# Patient Record
Sex: FEMALE | Race: WHITE | URBAN - METROPOLITAN AREA
[De-identification: names, ages, dates, MRNs, and addresses within clinical notes are randomized per-mention and may not be internally consistent; named-entity substitution may affect disease eponyms.]

---

## 2017-08-03 PROBLEM — Z00.00 ENCOUNTER FOR PREVENTIVE HEALTH EXAMINATION: Status: ACTIVE | Noted: 2017-08-03

## 2017-08-04 ENCOUNTER — OUTPATIENT (OUTPATIENT)
Dept: OUTPATIENT SERVICES | Facility: HOSPITAL | Age: 72
LOS: 1 days | End: 2017-08-04
Payer: COMMERCIAL

## 2017-08-04 ENCOUNTER — APPOINTMENT (OUTPATIENT)
Dept: ULTRASOUND IMAGING | Facility: HOSPITAL | Age: 72
End: 2017-08-04

## 2017-08-04 PROCEDURE — 93880 EXTRACRANIAL BILAT STUDY: CPT

## 2017-08-04 PROCEDURE — 93880 EXTRACRANIAL BILAT STUDY: CPT | Mod: 26

## 2017-08-07 ENCOUNTER — OUTPATIENT (OUTPATIENT)
Dept: OUTPATIENT SERVICES | Facility: HOSPITAL | Age: 72
LOS: 1 days | End: 2017-08-07
Payer: COMMERCIAL

## 2017-08-07 PROCEDURE — 93306 TTE W/DOPPLER COMPLETE: CPT | Mod: 26

## 2017-08-07 PROCEDURE — 93306 TTE W/DOPPLER COMPLETE: CPT

## 2022-08-01 ENCOUNTER — APPOINTMENT (OUTPATIENT)
Dept: OTOLARYNGOLOGY | Facility: CLINIC | Age: 77
End: 2022-08-01

## 2022-08-01 DIAGNOSIS — H90.3 SENSORINEURAL HEARING LOSS, BILATERAL: ICD-10-CM

## 2022-08-01 DIAGNOSIS — H93.293 OTHER ABNORMAL AUDITORY PERCEPTIONS, BILATERAL: ICD-10-CM

## 2022-08-01 DIAGNOSIS — H61.23 IMPACTED CERUMEN, BILATERAL: ICD-10-CM

## 2022-08-01 PROCEDURE — 69210 REMOVE IMPACTED EAR WAX UNI: CPT

## 2022-08-01 PROCEDURE — 99202 OFFICE O/P NEW SF 15 MIN: CPT | Mod: 25

## 2022-08-01 RX ORDER — VORTIOXETINE 10 MG/1
10 TABLET, FILM COATED ORAL
Qty: 30 | Refills: 0 | Status: ACTIVE | COMMUNITY
Start: 2022-07-19

## 2022-08-01 RX ORDER — OLANZAPINE 5 MG/1
5 TABLET, FILM COATED ORAL
Qty: 90 | Refills: 0 | Status: ACTIVE | COMMUNITY
Start: 2022-05-10

## 2022-08-01 RX ORDER — QUETIAPINE FUMARATE 50 MG/1
50 TABLET ORAL
Qty: 90 | Refills: 0 | Status: ACTIVE | COMMUNITY
Start: 2022-07-19

## 2022-08-01 RX ORDER — MEMANTINE HYDROCHLORIDE 10 MG/1
10 TABLET, FILM COATED ORAL
Qty: 60 | Refills: 0 | Status: ACTIVE | COMMUNITY
Start: 2022-07-19

## 2022-08-01 RX ORDER — DIVALPROEX SODIUM 250 MG/1
250 TABLET, DELAYED RELEASE ORAL
Qty: 90 | Refills: 0 | Status: ACTIVE | COMMUNITY
Start: 2022-07-19

## 2022-08-01 RX ORDER — OLANZAPINE AND SAMIDORPHAN L-MALATE 10; 10 MG/1; MG/1
10-10 TABLET, FILM COATED ORAL
Qty: 30 | Refills: 0 | Status: ACTIVE | COMMUNITY
Start: 2022-07-21

## 2022-08-01 NOTE — REVIEW OF SYSTEMS
[Negative] : Heme/Lymph [Patient Intake Form Reviewed] : Patient intake form was reviewed [FreeTextEntry1] : cognitive impairment

## 2022-08-01 NOTE — ASSESSMENT
[FreeTextEntry1] : large amount cerumen cleared au\par question of underlying hearing loss\par rec audio\par declines at this time\par fu one y

## 2022-08-12 ENCOUNTER — EMERGENCY (EMERGENCY)
Facility: HOSPITAL | Age: 77
LOS: 0 days | Discharge: HOME | End: 2022-08-13
Attending: EMERGENCY MEDICINE
Payer: MEDICARE

## 2022-08-12 VITALS
HEART RATE: 59 BPM | SYSTOLIC BLOOD PRESSURE: 114 MMHG | WEIGHT: 160.06 LBS | HEIGHT: 62 IN | OXYGEN SATURATION: 97 % | TEMPERATURE: 98 F | DIASTOLIC BLOOD PRESSURE: 60 MMHG | RESPIRATION RATE: 16 BRPM

## 2022-08-12 DIAGNOSIS — Z20.822 CONTACT WITH AND (SUSPECTED) EXPOSURE TO COVID-19: ICD-10-CM

## 2022-08-12 DIAGNOSIS — F03.90 UNSPECIFIED DEMENTIA WITHOUT BEHAVIORAL DISTURBANCE: ICD-10-CM

## 2022-08-12 DIAGNOSIS — R53.83 OTHER FATIGUE: ICD-10-CM

## 2022-08-12 DIAGNOSIS — R56.9 UNSPECIFIED CONVULSIONS: ICD-10-CM

## 2022-08-12 DIAGNOSIS — I49.8 OTHER SPECIFIED CARDIAC ARRHYTHMIAS: ICD-10-CM

## 2022-08-12 LAB
ALBUMIN SERPL ELPH-MCNC: 3.2 G/DL — LOW (ref 3.3–5)
ALP SERPL-CCNC: 75 U/L — SIGNIFICANT CHANGE UP (ref 40–120)
ALT FLD-CCNC: 20 U/L — SIGNIFICANT CHANGE UP (ref 12–78)
ANION GAP SERPL CALC-SCNC: 7 MMOL/L — SIGNIFICANT CHANGE UP (ref 5–17)
APTT BLD: 23 SEC — LOW (ref 27.5–35.5)
AST SERPL-CCNC: 14 U/L — LOW (ref 15–37)
BASOPHILS # BLD AUTO: 0.04 K/UL — SIGNIFICANT CHANGE UP (ref 0–0.2)
BASOPHILS NFR BLD AUTO: 0.4 % — SIGNIFICANT CHANGE UP (ref 0–2)
BILIRUB SERPL-MCNC: 0.1 MG/DL — LOW (ref 0.2–1.2)
BUN SERPL-MCNC: 19 MG/DL — SIGNIFICANT CHANGE UP (ref 7–23)
CALCIUM SERPL-MCNC: 8.4 MG/DL — LOW (ref 8.5–10.1)
CHLORIDE SERPL-SCNC: 106 MMOL/L — SIGNIFICANT CHANGE UP (ref 96–108)
CO2 SERPL-SCNC: 27 MMOL/L — SIGNIFICANT CHANGE UP (ref 22–31)
CREAT SERPL-MCNC: 0.74 MG/DL — SIGNIFICANT CHANGE UP (ref 0.5–1.3)
EGFR: 83 ML/MIN/1.73M2 — SIGNIFICANT CHANGE UP
EOSINOPHIL # BLD AUTO: 0.15 K/UL — SIGNIFICANT CHANGE UP (ref 0–0.5)
EOSINOPHIL NFR BLD AUTO: 1.6 % — SIGNIFICANT CHANGE UP (ref 0–6)
FLUAV AG NPH QL: SIGNIFICANT CHANGE UP
FLUBV AG NPH QL: SIGNIFICANT CHANGE UP
GLUCOSE SERPL-MCNC: 129 MG/DL — HIGH (ref 70–99)
HCT VFR BLD CALC: 34.5 % — SIGNIFICANT CHANGE UP (ref 34.5–45)
HGB BLD-MCNC: 11.4 G/DL — LOW (ref 11.5–15.5)
IMM GRANULOCYTES NFR BLD AUTO: 0.8 % — SIGNIFICANT CHANGE UP (ref 0–1.5)
INR BLD: 1.2 RATIO — HIGH (ref 0.88–1.16)
LYMPHOCYTES # BLD AUTO: 1.87 K/UL — SIGNIFICANT CHANGE UP (ref 1–3.3)
LYMPHOCYTES # BLD AUTO: 19.7 % — SIGNIFICANT CHANGE UP (ref 13–44)
MCHC RBC-ENTMCNC: 31.8 PG — SIGNIFICANT CHANGE UP (ref 27–34)
MCHC RBC-ENTMCNC: 33 GM/DL — SIGNIFICANT CHANGE UP (ref 32–36)
MCV RBC AUTO: 96.1 FL — SIGNIFICANT CHANGE UP (ref 80–100)
MONOCYTES # BLD AUTO: 0.81 K/UL — SIGNIFICANT CHANGE UP (ref 0–0.9)
MONOCYTES NFR BLD AUTO: 8.6 % — SIGNIFICANT CHANGE UP (ref 2–14)
NEUTROPHILS # BLD AUTO: 6.52 K/UL — SIGNIFICANT CHANGE UP (ref 1.8–7.4)
NEUTROPHILS NFR BLD AUTO: 68.9 % — SIGNIFICANT CHANGE UP (ref 43–77)
PLATELET # BLD AUTO: 180 K/UL — SIGNIFICANT CHANGE UP (ref 150–400)
POTASSIUM SERPL-MCNC: 3.4 MMOL/L — LOW (ref 3.5–5.3)
POTASSIUM SERPL-SCNC: 3.4 MMOL/L — LOW (ref 3.5–5.3)
PROT SERPL-MCNC: 6.9 GM/DL — SIGNIFICANT CHANGE UP (ref 6–8.3)
PROTHROM AB SERPL-ACNC: 14 SEC — HIGH (ref 10.5–13.4)
RBC # BLD: 3.59 M/UL — LOW (ref 3.8–5.2)
RBC # FLD: 13.3 % — SIGNIFICANT CHANGE UP (ref 10.3–14.5)
RSV RNA NPH QL NAA+NON-PROBE: SIGNIFICANT CHANGE UP
SARS-COV-2 RNA SPEC QL NAA+PROBE: SIGNIFICANT CHANGE UP
SODIUM SERPL-SCNC: 140 MMOL/L — SIGNIFICANT CHANGE UP (ref 135–145)
TROPONIN I, HIGH SENSITIVITY RESULT: 4.15 NG/L — SIGNIFICANT CHANGE UP
WBC # BLD: 9.47 K/UL — SIGNIFICANT CHANGE UP (ref 3.8–10.5)
WBC # FLD AUTO: 9.47 K/UL — SIGNIFICANT CHANGE UP (ref 3.8–10.5)

## 2022-08-12 PROCEDURE — 0042T: CPT | Mod: MA

## 2022-08-12 PROCEDURE — 70496 CT ANGIOGRAPHY HEAD: CPT | Mod: 26,MA

## 2022-08-12 PROCEDURE — 70498 CT ANGIOGRAPHY NECK: CPT | Mod: 26,MA

## 2022-08-12 PROCEDURE — 0241U: CPT

## 2022-08-12 PROCEDURE — 70498 CT ANGIOGRAPHY NECK: CPT | Mod: MA

## 2022-08-12 PROCEDURE — 81001 URINALYSIS AUTO W/SCOPE: CPT

## 2022-08-12 PROCEDURE — 93010 ELECTROCARDIOGRAM REPORT: CPT

## 2022-08-12 PROCEDURE — 82962 GLUCOSE BLOOD TEST: CPT

## 2022-08-12 PROCEDURE — 96375 TX/PRO/DX INJ NEW DRUG ADDON: CPT

## 2022-08-12 PROCEDURE — 0042T: CPT

## 2022-08-12 PROCEDURE — 96374 THER/PROPH/DIAG INJ IV PUSH: CPT | Mod: XU

## 2022-08-12 PROCEDURE — 99291 CRITICAL CARE FIRST HOUR: CPT

## 2022-08-12 PROCEDURE — 99285 EMERGENCY DEPT VISIT HI MDM: CPT

## 2022-08-12 PROCEDURE — 70450 CT HEAD/BRAIN W/O DYE: CPT | Mod: XU,MA

## 2022-08-12 PROCEDURE — 71045 X-RAY EXAM CHEST 1 VIEW: CPT | Mod: 26

## 2022-08-12 PROCEDURE — 99285 EMERGENCY DEPT VISIT HI MDM: CPT | Mod: 25

## 2022-08-12 PROCEDURE — 85025 COMPLETE CBC W/AUTO DIFF WBC: CPT

## 2022-08-12 PROCEDURE — 93005 ELECTROCARDIOGRAM TRACING: CPT

## 2022-08-12 PROCEDURE — 85610 PROTHROMBIN TIME: CPT

## 2022-08-12 PROCEDURE — 71045 X-RAY EXAM CHEST 1 VIEW: CPT

## 2022-08-12 PROCEDURE — 70496 CT ANGIOGRAPHY HEAD: CPT | Mod: MA

## 2022-08-12 PROCEDURE — 85730 THROMBOPLASTIN TIME PARTIAL: CPT

## 2022-08-12 PROCEDURE — 84484 ASSAY OF TROPONIN QUANT: CPT

## 2022-08-12 PROCEDURE — 95816 EEG AWAKE AND DROWSY: CPT | Mod: 26

## 2022-08-12 PROCEDURE — 36415 COLL VENOUS BLD VENIPUNCTURE: CPT

## 2022-08-12 PROCEDURE — 95816 EEG AWAKE AND DROWSY: CPT

## 2022-08-12 PROCEDURE — 80053 COMPREHEN METABOLIC PANEL: CPT

## 2022-08-12 RX ORDER — LEVETIRACETAM 250 MG/1
1000 TABLET, FILM COATED ORAL ONCE
Refills: 0 | Status: COMPLETED | OUTPATIENT
Start: 2022-08-12 | End: 2022-08-12

## 2022-08-12 RX ADMIN — LEVETIRACETAM 400 MILLIGRAM(S): 250 TABLET, FILM COATED ORAL at 13:26

## 2022-08-12 RX ADMIN — Medication 1 MILLIGRAM(S): at 13:26

## 2022-08-12 NOTE — ED PROVIDER NOTE - NS ED ROS FT
Constitutional: No fevers, chills, or sweats. +lethargy  Cardiac: No chest pain, exertional dyspnea, orthopnea  Respiratory: No shortness of breath, no cough  GI: No abdominal pain, no N/V/D  Neuro: No headaches, no neck pain/stiffness, no numbness +seizure +L leg plegia   All other systems reviewed and are negative unless otherwise stated in the HPI.

## 2022-08-12 NOTE — ED ADULT NURSE NOTE - OBJECTIVE STATEMENT
Pt BIBEMS s/p seizure at 11:40 this morning, Pt daughter Renae at bedside states that pt has dementia at baseline and that she lives at home with her and that the pt has an aide in place at home. Renae explains that pt was on the floor seizing this morning and an ambulance was called. No hx of seizures, no hx of stroke. Pt nonverbal while assessing her, daughter states that she is usually verbal at home and able to do basic things at home, she eats by herself and ambulates to the bathroom at home. Pt on stretcher in room now unable to follow commands. Pt is moving all 4 extremities, and is agitated upon assessment. Dr Guevara and Dr Hoffmann at bedside, ativan administered.

## 2022-08-12 NOTE — ED PROVIDER NOTE - PATIENT PORTAL LINK FT
You can access the FollowMyHealth Patient Portal offered by [facility] by registering at the following website: [email or web address]. By joining [facility]’s FollowMyHealth portal, you will also be able to view your health information using other applications (apps) compatible with our system. You can access the FollowMyHealth Patient Portal offered by [facility] by registering at the following website: [email or web address]. By joining [facility]’s FollowMyHealth portal, you will also be able to view your health information using other applications (apps) compatible with our system.

## 2022-08-12 NOTE — ED ADULT NURSE NOTE - NSIMPLEMENTINTERV_GEN_ALL_ED
Implemented All Fall with Harm Risk Interventions:  [place] to call system. Call bell, personal items and telephone within reach. Instruct patient to call for assistance. Room bathroom lighting operational. Non-slip footwear when patient is off stretcher. Physically safe environment: no spills, clutter or unnecessary equipment. Stretcher in lowest position, wheels locked, appropriate side rails in place. Provide visual cue, wrist band, yellow gown, etc. Monitor gait and stability. Monitor for mental status changes and reorient to person, place, and time. Review medications for side effects contributing to fall risk. Reinforce activity limits and safety measures with patient and family. Provide visual clues: red socks.

## 2022-08-12 NOTE — ED PROVIDER NOTE - OBJECTIVE STATEMENT
76 y/o Nigerien speaking female with a PMHx of dementia presents to the ED after seizure. Per EMS, pt was found not moving L arm or leg, normal finger stick.On arrival to ED,  pt regained sponataneous movements of L side.  On olanzapine-samidorphan 10mg , divalproex 250mg, donzepezil 10mg , memantine 10mg , quetiapine 50mg , vortioxetine 10mg , olanzapine 5mg , difloxacin HCl 0.3%. 76 y/o [place] speaking female with a PMHx of dementia presents to the ED after seizure. Per EMS, pt was found not moving L arm or leg, normal finger stick. On arrival to ED,  pt regained spontaneous movements of L side. States pt was recently started on olanzapine- samidorphan 10mg,  notes pt was in normal state with at home nurse, was walking with walker, froze and begun to have seizure. No known trauma, seizure lasted 1min. Post-ictal symptoms of lethargy, not moving L leg, rolling eyes. No h/o seizures.  On divalproex 250mg, Donepezil 10mg , memantine 10mg , quetiapine 50mg , vortioxetine 10mg , olanzapine 5mg , difloxacin HCl 0.3%.

## 2022-08-12 NOTE — ED PROVIDER NOTE - PROGRESS NOTE DETAILS
CC:  Signover received from Dr. Guevara to f/u EEG report from Dr. Hoffmann, expect D/C if negative.  Received phone call from Dr. Hoffmann: EEG essentially negative, she advises D/C home off her Lybatyl & Zyprexa & to increase her Depakote to 2 x a day, outpt Neuro f/u. CC:  Informed by ED RN that pt still too drowsy after previously medicated with IV Ativan.  Will hold formal D/C until pt more awake.  ED RN calling daughter to give update. CC:  Pt still clinically too lethargic for proper D/C.  Daughter agrees to return in morning to pick her up, after pt wakes up. Danay NAYAK: Received s/o from Dr. Torres- patient with new onset seizure; EEG done in ED; I spoke to patient's daughter multiple times and wants to take her home; successful ambulation trial performed in the ED; UA negative; depakote increased to BID per neurology note; strict return precautions given.

## 2022-08-12 NOTE — ED PROVIDER NOTE - CLINICAL SUMMARY MEDICAL DECISION MAKING FREE TEXT BOX
Will get labs, CT , neuro w/o. NSR with sinus arrythmia at  64 bpm. Normal axis, normal intervals, no acute ischemic changes.

## 2022-08-12 NOTE — ED PROVIDER NOTE - CARE PROVIDER_API CALL
Kandis Hoffmann (MD)  Clinical Neurophysiology; EEGEpilepsy; Neurology; Sleep Medicine  [address] [facility], [place] [address]  Phone: [phone]  Fax: [phone]  Follow Up Time:

## 2022-08-12 NOTE — STROKE CODE NOTE - NSMDCONSULT QTN_Y FT
Patient is a 77y old  Female who presents with a chief complaint of seizure/unresponsive episode    Time patient arrived to ED: 12:39    HPI: 76 y/o Japanese speaking female with a PMHx of dementia presented to  ED on 8/19 s/p seizure, LKN is unknown. Per EMS, pt was found not moving L arm or leg, and upon arrival to ED, pt regained movement of L side. Daughter at bedside provided history. She states that this morning around 8:30, pt's aide saw pt making her bed, when she slipped and fell onto her behind. She was able to get up on her own. At 11:45 am daughter heard a loud bang, aide said she had taken a fall, and daughter found her laying in doorway of bathroom and she was seizing on her back, her eyes rolled, and the entire episode lasted for about a minute. She also had urinary incontinence at that time. Daughter called EMS and pt was taken to ED.    In ED code stroke was called. CTH showed no acute infarct or hemorrhage. CTA head and neck showed no LVO, stenosis or aneurysms. CT perfusion showed no core infarct. IV TPA was not given because sx were not consistent with stroke on presenation, pt was post-ictal, LKN was unclear, NIHSS ~22- patient is awake but is non responsive.  Pt was given Keppra load and 1 mg Ativan, as well as ASA.    Upon evaluation, pt cannot follow commands and will not speak. Daughter reports she has severe Alzheimer's, at baseline is AAO x 1 and can usually only state her name. Her psychiatrist recently started her on a new medication called Lybalvi, which she has taken for the past three days, and she had stopped her Seroquel for the past three days. Daughter reports pt has never had a seizure or stroke.     PAST MEDICAL & SURGICAL HISTORY:  Alzheimer's Disease  Depression    FAMILY HISTORY: Noncontributory     Social Hx:  Nonsmoker, no drug or alcohol use    Allergies: Strawberries and novocaine    MEDICATIONS  (Home):  Seroquel 50 mg qhs (recently discontinued)  Zyprexa 2.5 mg TID  Trintellix 10 mg qhs  Namenda 10 mg BID  Depakote 25 mg qhs  Lybalvi qhs    ROS: Unable to obtain    Vital Signs Last 24 Hrs  T(C): 36.4 (12 Aug 2022 12:41), Max: 36.4 (12 Aug 2022 12:41)  T(F): 97.6 (12 Aug 2022 12:41), Max: 97.6 (12 Aug 2022 12:41)  HR: 59 (12 Aug 2022 12:41) (59 - 59)  BP: 114/60 (12 Aug 2022 12:41) (114/60 - 114/60)  BP(mean): --  RR: 16 (12 Aug 2022 12:41) (16 - 16)  SpO2: 97% (12 Aug 2022 12:41) (97% - 97%)    Parameters below as of 12 Aug 2022 12:41  Patient On (Oxygen Delivery Method): room air    Physical Exam:  Gen: NAD, normocephalic  HEENT: PERRLA  Neck: Supple  Respiratory: Breath sounds are clear bilaterally  Cardiovascular: S1 and S2  Extremities:  no edema  Vascular: No carotid Bruit  Musculoskeletal: no joint swelling/tenderness, no abnormal movements  Skin: No rashes    Neurological Exam:  HF: Awake, not alert, cannot assess orientation as pt will not speak   CN: PERRL, +blinks to threat, no NLFD, tongue midline  Motor: Moves all 4 extremities spontaneously and anti-gravity, but will not hold up extremities upon command  Sens: Withdraws to tactile stimuli  Reflexes: Symmetric and normal, downgoing toes b/l  Coord:  Cannot assess  Gait/Balance: Cannot test    NIHSS ~ 22- although exam is grossly non focal    Labs:                        11.4   9.47  )-----------( 180      ( 12 Aug 2022 13:24 )             34.5     Radiology:  CT Angio Brain Stroke Protocol  w/ IV Cont (08.12.22 @ 13:00) >    IMPRESSION:  *  NO EVIDENCE OF AN ACUTE INTRACRANIAL HEMORRHAGE, MIDLINE SHIFT, OR   HYDROCEPHALUS. MODERATE ATROPHY WITH MILD WHITE MATTER ISCHEMIC CHANGES  *  NO HEMODYNAMIC SIGNIFICANT NARROWING WITHIN THE NECK.  *  NO PROXIMAL LARGE VESSEL OCCLUSION INTRACRANIALLY.  *  NO AREAS OF ISCHEMIA IDENTIFIED ON PERFUSION IMAGING.    A/P: 76 y/o Japanese speaking female with a PMHx of dementia presented to  ED on 8/19 s/p seizure, LKN is unknown. Per EMS, pt was found not moving L arm or leg, and upon arrival to ED, pt regained movement of L side. Daughter at bedside provided history. She states that this morning around 8:30, pt's aide saw pt making her bed, when she slipped and fell onto her behind. She was able to get up on her own. At 11:45 am daughter heard a loud bang, aide said she had taken a fall, and daughter found her laying in doorway of bathroom and she was seizing on her back, her eyes rolled, and the entire episode lasted for about a minute. She also had urinary incontinence at that time. Daughter called EMS and pt was taken to ED.    In ED code stroke was called. CTH showed no acute infarct or hemorrhage. CTA head and neck showed no LVO, stenosis or aneurysms. CT perfusion showed no core infarct. IV TPA was not given because sx were not consistent with stroke on presentation, pt was post-ictal, LKN was unclear, NIHSS ~22- patient is awake but is non responsive.  Pt was given Keppra load and 1 mg Ativan, as well as ASA.    #S/p seizure episode, with dementia at baseline    #R/o acute CVA- less likely given presentation    - MRI head ordered to r/o intracranial pathology as cause for seizure  - Pt currently takes depakote at night only; can increase dose for seizure prophylaxis   - ASA 81 mg QD  - Atorvastatin, lipid profile, Hgb A1c within 24 hours  - Neuro checks Q4h  - Vital signs Q4h  - Blood glucose checks Q6h for 1st 24hrs  - Echo  - PT eval  - Dysphagia screen today  - DVT prophylaxis  - Telemonitoring    Patient was seen and discussed with Dr. Hoffmann Patient is a 77y old  Female who presents with a chief complaint of seizure/unresponsive episode    Time patient arrived to ED: 12:39    HPI: 78 y/o Citizen of the [place] speaking female with a PMHx of dementia presented to  ED on 8/19 s/p seizure, LKN is unknown. Per EMS, pt was found not moving L arm or leg, and upon arrival to ED, pt regained movement of L side. Daughter at bedside provided history. She states that this morning around 8:30, pt's aide saw pt making her bed, when she slipped and fell onto her behind. She was able to get up on her own. At 11:45 am daughter heard a loud bang, aide said she had taken a fall, and daughter found her laying in doorway of bathroom and she was seizing on her back, her eyes rolled, and the entire episode lasted for about a minute. She also had urinary incontinence at that time. Daughter called EMS and pt was taken to ED.    In ED code stroke was called. CTH showed no acute infarct or hemorrhage. CTA head and neck showed no LVO, stenosis or aneurysms. CT perfusion showed no core infarct. IV TPA was not given because sx were not consistent with stroke on presenation, pt was post-ictal, LKN was unclear, NIHSS ~22- patient is awake but is non responsive.  Pt was given Keppra load and 1 mg Ativan.    Upon evaluation, pt cannot follow commands and will not speak. Daughter reports she has severe Alzheimer's, at baseline is AAO x 1 and can usually only state her name. Her psychiatrist recently started her on a new medication called Lybalvi, which she has taken for the past three days, and she had stopped her Seroquel for the past three days. Daughter reports pt has never had a seizure or stroke.     PAST MEDICAL & SURGICAL HISTORY:  Alzheimer's Disease  Depression    FAMILY HISTORY: Noncontributory     Social Hx:  Nonsmoker, no drug or alcohol use    Allergies: Strawberries and novocaine    MEDICATIONS  (Home):  Seroquel 50 mg qhs (recently discontinued)  Zyprexa 2.5 mg TID  Trintellix 10 mg qhs  Namenda 10 mg BID  Depakote 25 mg qhs  Lybalvi qhs    ROS: Unable to obtain    Vital Signs Last 24 Hrs  T(C): 36.4 (12 Aug 2022 12:41), Max: 36.4 (12 Aug 2022 12:41)  T(F): 97.6 (12 Aug 2022 12:41), Max: 97.6 (12 Aug 2022 12:41)  HR: 59 (12 Aug 2022 12:41) (59 - 59)  BP: 114/60 (12 Aug 2022 12:41) (114/60 - 114/60)  BP(mean): --  RR: 16 (12 Aug 2022 12:41) (16 - 16)  SpO2: 97% (12 Aug 2022 12:41) (97% - 97%)    Parameters below as of 12 Aug 2022 12:41  Patient On (Oxygen Delivery Method): room air    Physical Exam:  Gen: NAD, normocephalic  HEENT: PERRLA  Neck: Supple  Respiratory: Breath sounds are clear bilaterally  Cardiovascular: S1 and S2  Extremities:  no edema  Vascular: No carotid Bruit  Musculoskeletal: no joint swelling/tenderness, no abnormal movements  Skin: No rashes    Neurological Exam:  HF: Awake, not alert, cannot assess orientation as pt will not speak   CN: PERRL, +blinks to threat, no NLFD, tongue midline  Motor: Moves all 4 extremities spontaneously and anti-gravity, but will not hold up extremities upon command  Sens: Withdraws to tactile stimuli  Reflexes: Symmetric and normal, downgoing toes b/l  Coord:  Cannot assess  Gait/Balance: Cannot test    NIHSS ~ 22- although exam is grossly non focal    Labs:                        11.4   9.47  )-----------( 180      ( 12 Aug 2022 13:24 )             34.5     Radiology:  CT Angio Brain Stroke Protocol  w/ IV Cont (08.12.22 @ 13:00) >    IMPRESSION:  *  NO EVIDENCE OF AN ACUTE INTRACRANIAL HEMORRHAGE, MIDLINE SHIFT, OR   HYDROCEPHALUS. MODERATE ATROPHY WITH MILD WHITE MATTER ISCHEMIC CHANGES  *  NO HEMODYNAMIC SIGNIFICANT NARROWING WITHIN THE NECK.  *  NO PROXIMAL LARGE VESSEL OCCLUSION INTRACRANIALLY.  *  NO AREAS OF ISCHEMIA IDENTIFIED ON PERFUSION IMAGING.    A/P: 78 y/o Citizen of the [place] speaking female with a PMHx of dementia presented to  ED on 8/19 s/p seizure, LKN is unknown. Per EMS, pt was found not moving L arm or leg, and upon arrival to ED, pt regained movement of L side. Daughter at bedside provided history. She states that this morning around 8:30, pt's aide saw pt making her bed, when she slipped and fell onto her behind. She was able to get up on her own. At 11:45 am daughter heard a loud bang, aide said she had taken a fall, and daughter found her laying in doorway of bathroom and she was seizing on her back, her eyes rolled, and the entire episode lasted for about a minute. She also had urinary incontinence at that time. Daughter called EMS and pt was taken to ED.    In ED code stroke was called. CTH showed no acute infarct or hemorrhage. CTA head and neck showed no LVO, stenosis or aneurysms. CT perfusion showed no core infarct. IV TPA was not given because sx were not consistent with stroke on presentation, pt was post-ictal, LKN was unclear, NIHSS ~22- patient is awake but is non responsive.  Pt was given Keppra load and 1 mg Ativan.    #S/p seizure episode, with dementia at baseline    If admitted:     - MRI head to r/o intracranial pathology as cause for seizure  - Routine EEG can be done now in ED   - Pt currently takes depakote at night only; can increase dose for seizure prophylaxis   - Dysphagia screen today  - DVT prophylaxis    Patient was seen and discussed with Dr. Hoffmann

## 2022-08-12 NOTE — ED PROVIDER NOTE - NSFOLLOWUPINSTRUCTIONS_ED_ALL_ED_FT
You are cleared for discharge home.  Stop the Lybatyl & your Zyprexa.  Increase your Depokate to 2 x a day,.  Follow up with own neurologist or Dr. Hoffmann as per below.  Follow up with own primary & psychiatry doctors.  Continue your other medications as per routine.      Seizure, Adult      A seizure is a sudden burst of abnormal electrical and chemical activity in the brain. Seizures usually last from 30 seconds to 2 minutes.       What are the causes?    Common causes of this condition include:  •Fever or infection.    •Problems that affect the brain. These may include:  •A brain or head injury.      •Bleeding in the brain.      •A brain tumor.        •Low levels of blood sugar or salt.      •Kidney problems or liver problems.      •Conditions that are passed from parent to child (are inherited).    •Problems with a substance, such as:  •Having a reaction to a drug or a medicine.      •Stopping the use of a substance all of a sudden (withdrawal).        •A stroke.      •Disorders that affect how you develop.      Sometimes, the cause may not be known.       What increases the risk?    •Having someone in your family who has epilepsy. In this condition, seizures happen again and again over time. They have no clear cause.    •Having had a tonic–clonic seizure before. This type of seizure causes you to:  •Tighten the muscles of the whole body.      •Lose consciousness.        •Having had a head injury or strokes before.      •Having had a lack of oxygen at birth.        What are the signs or symptoms?    There are many types of seizures. The symptoms vary depending on the type of seizure you have.    Symptoms during a seizure     •Shaking that you cannot control (convulsions) with fast, jerky movements of muscles.      •Stiffness of the body.      •Breathing problems.      •Feeling mixed up (confused).      •Staring or not responding to sound or touch.      •Head nodding.      •Eyes that blink, flutter, or move fast.      •Drooling, grunting, or making clicking sounds with your mouth      •Losing control of when you pee or poop.      Symptoms before a seizure     •Feeling afraid, nervous, or worried.      •Feeling like you may vomit.    •Feeling like:  •You are moving when you are not.      •Things around you are moving when they are not.        •Feeling like you saw or heard something before (déjà vu).      •Odd tastes or smells.      •Changes in how you see. You may see flashing lights or spots.      Symptoms after a seizure     •Feeling confused.      •Feeling sleepy.      •Headache.       •Sore muscles.        How is this treated?    If your seizure stops on its own, you will not need treatment. If your seizure lasts longer than 5 minutes, you will normally need treatment. Treatment may include:  •Medicines given through an IV tube.      •Avoiding things, such as medicines, that are known to cause your seizures.      •Medicines to prevent seizures.      •A device to prevent or control seizures.      •Surgery.      •A diet low in carbohydrates and high in fat (ketogenic diet).        Follow these instructions at home:    Medicines     •Take over-the-counter and prescription medicines only as told by your doctor.      •Avoid foods or drinks that may keep your medicine from working, such as alcohol.      Activity     •Follow instructions about driving, swimming, or doing things that would be dangerous if you had another seizure. Wait until your doctor says it is safe for you to do these things.      •If you live in the [place] ask your local department of [employer] when you can drive.      •Get a lot of rest.        Teaching others    •Teach friends and family what to do when you have a seizure. They should:  •Help you get down to the ground.      •Protect your head and body.      •Loosen any clothing around your neck.      •Turn you on your side.      •Know whether or not you need emergency care.      •Stay with you until you are better.      •Also, tell them what not to do if you have a seizure. Tell them:  •They should not hold you down.      •They should not put anything in your mouth.        General instructions     •Avoid anything that gives you seizures.    •Keep a seizure diary. Write down:  •What you remember about each seizure.      •What you think caused each seizure.        •Keep all follow-up visits.        Contact a doctor if:    •You have another seizure or seizures. Call the doctor each time you have a seizure.      •The pattern of your seizures changes.      •You keep having seizures with treatment.      •You have symptoms of being sick or having an infection.      •You are not able to take your medicine.        Get help right away if:  •You have any of these problems:  •A seizure that lasts longer than 5 minutes.      •Many seizures in a row and you do not feel better between seizures.      •A seizure that makes it harder to breathe.      •A seizure and you can no longer speak or use part of your body.        •You do not wake up right after a seizure.      •You get hurt during a seizure.      •You feel confused or have pain right after a seizure.      These symptoms may be an emergency. Get help right away. Call your local emergency services (911 in the [place]   • Do not wait to see if the symptoms will go away.       • Do not drive yourself to the hospital.         Summary    •A seizure is a sudden burst of abnormal electrical and chemical activity in the brain. Seizures normally last from 30 seconds to 2 minutes.      •Causes of seizures include illness, injury to the head, low levels of blood sugar or salt, and certain conditions.      •Most seizures will stop on their own in less than 5 minutes. Seizures that last longer than 5 minutes are a medical emergency and need treatment right away.      •Many medicines are used to treat seizures. Take over-the-counter and prescription medicines only as told by your doctor.      This information is not intended to replace advice given to you by your health care provider. Make sure you discuss any questions you have with your health care provider.

## 2022-08-12 NOTE — ED ADULT TRIAGE NOTE - CHIEF COMPLAINT QUOTE
Patient comes in s/p seizure, lasting about 1 minute. Patient began to have a left sided gaze, left sided facial droop, and left sided weakness after seizure. Last known well 1200. MD Guevara assessed patient at triage, code stroke called at 1241.

## 2022-08-12 NOTE — ED PROVIDER NOTE - PHYSICAL EXAMINATION
General: AA, NAD  HEENT: NCAT  Cardiac: Normal rate and rhythym, no murmurs, normal peripheral perfusion  Respiratory: Normal rate and effort. CTAB  GI: Soft, nondistended, nontender  Neuro: No focal deficits. DAWSON equally x4, sensation to light touch intact throughout, moving all extremities, no focal deficits, cranial nerves intact. NIH 0.  MSK: FROMx4, no focal bony tenderness, no peripheral edema   Skin: No rash

## 2022-08-13 VITALS
RESPIRATION RATE: 17 BRPM | HEART RATE: 83 BPM | OXYGEN SATURATION: 95 % | SYSTOLIC BLOOD PRESSURE: 148 MMHG | TEMPERATURE: 98 F | DIASTOLIC BLOOD PRESSURE: 83 MMHG

## 2022-08-13 LAB
APPEARANCE UR: CLEAR — SIGNIFICANT CHANGE UP
BILIRUB UR-MCNC: NEGATIVE — SIGNIFICANT CHANGE UP
COLOR SPEC: YELLOW — SIGNIFICANT CHANGE UP
DIFF PNL FLD: ABNORMAL
GLUCOSE UR QL: NEGATIVE — SIGNIFICANT CHANGE UP
KETONES UR-MCNC: NEGATIVE — SIGNIFICANT CHANGE UP
LEUKOCYTE ESTERASE UR-ACNC: NEGATIVE — SIGNIFICANT CHANGE UP
NITRITE UR-MCNC: NEGATIVE — SIGNIFICANT CHANGE UP
PH UR: 6 — SIGNIFICANT CHANGE UP (ref 5–8)
PROT UR-MCNC: NEGATIVE — SIGNIFICANT CHANGE UP
SP GR SPEC: 1.01 — SIGNIFICANT CHANGE UP (ref 1.01–1.02)
UROBILINOGEN FLD QL: NEGATIVE — SIGNIFICANT CHANGE UP

## 2022-08-13 RX ORDER — DIVALPROEX SODIUM 500 MG/1
1 TABLET, DELAYED RELEASE ORAL
Qty: 60 | Refills: 0
Start: 2022-08-13 | End: 2022-09-11

## 2022-08-13 RX ORDER — DIVALPROEX SODIUM 500 MG/1
250 TABLET, DELAYED RELEASE ORAL ONCE
Refills: 0 | Status: COMPLETED | OUTPATIENT
Start: 2022-08-13 | End: 2022-08-13

## 2022-08-13 RX ORDER — MEMANTINE HYDROCHLORIDE 10 MG/1
10 TABLET ORAL ONCE
Refills: 0 | Status: COMPLETED | OUTPATIENT
Start: 2022-08-13 | End: 2022-08-13

## 2022-08-13 RX ORDER — DIVALPROEX SODIUM 500 MG/1
250 TABLET, DELAYED RELEASE ORAL ONCE
Refills: 0 | Status: DISCONTINUED | OUTPATIENT
Start: 2022-08-13 | End: 2022-08-13

## 2022-08-13 RX ADMIN — DIVALPROEX SODIUM 250 MILLIGRAM(S): 500 TABLET, DELAYED RELEASE ORAL at 08:51

## 2022-08-13 RX ADMIN — MEMANTINE HYDROCHLORIDE 10 MILLIGRAM(S): 10 TABLET ORAL at 08:51

## 2022-08-13 NOTE — ED ADULT NURSE REASSESSMENT NOTE - NS ED NURSE REASSESS COMMENT FT1
pt. awake and severely confused, AOx0, attempts to get out of stretcher, unable to obtain full set of VS due to confusion. urine sent, aide arrived and now at bedside. seizure precautions maintained, pt. placed on bed alarm. incontinence care provided.

## 2023-03-16 NOTE — ED ADULT NURSE NOTE - NS ED NURSE IV DC DT
Cryotherapy Text: The wound bed was treated with cryotherapy after the biopsy was performed. 13-Aug-2022 11:32

## 2023-08-17 NOTE — ED PROVIDER NOTE - PROVIDER TOKENS
Area M Indication Text: Tumors in this location are included in Area M (cheek, forehead, scalp, neck, jawline and pretibial skin).  Mohs surgery is indicated for tumors in these anatomic locations. PROVIDER:[TOKEN:[[id number]]]

## 2024-08-11 NOTE — ED ADULT NURSE NOTE - NSINTERVENTIONOPT_GEN_ALL_ED
Lactation Consultant Note  Lactation Consultation       Maternal Information       Maternal Assessment       Infant Assessment       Feeding Assessment       LATCH TOOL       Breast Pump       Other OB Lactation Tools       Patient Follow-up       Other OB Lactation Documentation       Recommendations/Summary  Per bedside RN, patient would like a breast pump ordered for home use.     Will fax her insurance information to [employer]. Contact information to [employer] left at the bedside (mom sleeping).   [id number], and CDC pump cleaning guide also left with outpatient lactation resource list.   
Hourly Rounding

## 2025-01-09 ENCOUNTER — INPATIENT (INPATIENT)
Facility: HOSPITAL | Age: 80
LOS: 1 days | Discharge: HOME | DRG: 312 | End: 2025-01-11
Attending: STUDENT IN AN ORGANIZED HEALTH CARE EDUCATION/TRAINING PROGRAM | Admitting: STUDENT IN AN ORGANIZED HEALTH CARE EDUCATION/TRAINING PROGRAM
Payer: MEDICARE

## 2025-01-09 VITALS
OXYGEN SATURATION: 94 % | HEART RATE: 74 BPM | TEMPERATURE: 98 F | RESPIRATION RATE: 18 BRPM | DIASTOLIC BLOOD PRESSURE: 73 MMHG | SYSTOLIC BLOOD PRESSURE: 126 MMHG

## 2025-01-09 DIAGNOSIS — R55 SYNCOPE AND COLLAPSE: ICD-10-CM

## 2025-01-09 DIAGNOSIS — I48.92 UNSPECIFIED ATRIAL FLUTTER: ICD-10-CM

## 2025-01-09 DIAGNOSIS — Z29.9 ENCOUNTER FOR PROPHYLACTIC MEASURES, UNSPECIFIED: ICD-10-CM

## 2025-01-09 DIAGNOSIS — R56.9 UNSPECIFIED CONVULSIONS: ICD-10-CM

## 2025-01-09 PROBLEM — Z78.9 OTHER SPECIFIED HEALTH STATUS: Chronic | Status: ACTIVE | Noted: 2022-08-14

## 2025-01-09 LAB
ALBUMIN SERPL ELPH-MCNC: 3.8 G/DL — SIGNIFICANT CHANGE UP (ref 3.3–5)
ALP SERPL-CCNC: 104 U/L — SIGNIFICANT CHANGE UP (ref 40–120)
ALT FLD-CCNC: 14 U/L — SIGNIFICANT CHANGE UP (ref 12–78)
ANION GAP SERPL CALC-SCNC: 4 MMOL/L — LOW (ref 5–17)
AST SERPL-CCNC: 16 U/L — SIGNIFICANT CHANGE UP (ref 15–37)
BASOPHILS # BLD AUTO: 0.05 K/UL — SIGNIFICANT CHANGE UP (ref 0–0.2)
BASOPHILS NFR BLD AUTO: 0.6 % — SIGNIFICANT CHANGE UP (ref 0–2)
BILIRUB SERPL-MCNC: 0.2 MG/DL — SIGNIFICANT CHANGE UP (ref 0.2–1.2)
BUN SERPL-MCNC: 24 MG/DL — HIGH (ref 7–23)
CALCIUM SERPL-MCNC: 9 MG/DL — SIGNIFICANT CHANGE UP (ref 8.5–10.1)
CHLORIDE SERPL-SCNC: 107 MMOL/L — SIGNIFICANT CHANGE UP (ref 96–108)
CO2 SERPL-SCNC: 28 MMOL/L — SIGNIFICANT CHANGE UP (ref 22–31)
CREAT SERPL-MCNC: 0.96 MG/DL — SIGNIFICANT CHANGE UP (ref 0.5–1.3)
EGFR: 60 ML/MIN/1.73M2 — SIGNIFICANT CHANGE UP
EOSINOPHIL # BLD AUTO: 0.09 K/UL — SIGNIFICANT CHANGE UP (ref 0–0.5)
EOSINOPHIL NFR BLD AUTO: 1.1 % — SIGNIFICANT CHANGE UP (ref 0–6)
FLUAV AG NPH QL: SIGNIFICANT CHANGE UP
FLUBV AG NPH QL: SIGNIFICANT CHANGE UP
GLUCOSE SERPL-MCNC: 121 MG/DL — HIGH (ref 70–99)
HCT VFR BLD CALC: 40.6 % — SIGNIFICANT CHANGE UP (ref 34.5–45)
HGB BLD-MCNC: 12.9 G/DL — SIGNIFICANT CHANGE UP (ref 11.5–15.5)
IMM GRANULOCYTES NFR BLD AUTO: 0.3 % — SIGNIFICANT CHANGE UP (ref 0–0.9)
LYMPHOCYTES # BLD AUTO: 1.39 K/UL — SIGNIFICANT CHANGE UP (ref 1–3.3)
LYMPHOCYTES # BLD AUTO: 17.6 % — SIGNIFICANT CHANGE UP (ref 13–44)
MCHC RBC-ENTMCNC: 30.6 PG — SIGNIFICANT CHANGE UP (ref 27–34)
MCHC RBC-ENTMCNC: 31.8 G/DL — LOW (ref 32–36)
MCV RBC AUTO: 96.2 FL — SIGNIFICANT CHANGE UP (ref 80–100)
MONOCYTES # BLD AUTO: 0.81 K/UL — SIGNIFICANT CHANGE UP (ref 0–0.9)
MONOCYTES NFR BLD AUTO: 10.2 % — SIGNIFICANT CHANGE UP (ref 2–14)
NEUTROPHILS # BLD AUTO: 5.56 K/UL — SIGNIFICANT CHANGE UP (ref 1.8–7.4)
NEUTROPHILS NFR BLD AUTO: 70.2 % — SIGNIFICANT CHANGE UP (ref 43–77)
PLATELET # BLD AUTO: 211 K/UL — SIGNIFICANT CHANGE UP (ref 150–400)
POTASSIUM SERPL-MCNC: 3.7 MMOL/L — SIGNIFICANT CHANGE UP (ref 3.5–5.3)
POTASSIUM SERPL-SCNC: 3.7 MMOL/L — SIGNIFICANT CHANGE UP (ref 3.5–5.3)
PROT SERPL-MCNC: 8.1 GM/DL — SIGNIFICANT CHANGE UP (ref 6–8.3)
RBC # BLD: 4.22 M/UL — SIGNIFICANT CHANGE UP (ref 3.8–5.2)
RBC # FLD: 14.1 % — SIGNIFICANT CHANGE UP (ref 10.3–14.5)
RSV RNA NPH QL NAA+NON-PROBE: SIGNIFICANT CHANGE UP
SARS-COV-2 RNA SPEC QL NAA+PROBE: SIGNIFICANT CHANGE UP
SODIUM SERPL-SCNC: 139 MMOL/L — SIGNIFICANT CHANGE UP (ref 135–145)
TROPONIN I, HIGH SENSITIVITY RESULT: 11.48 NG/L — SIGNIFICANT CHANGE UP
WBC # BLD: 7.92 K/UL — SIGNIFICANT CHANGE UP (ref 3.8–10.5)
WBC # FLD AUTO: 7.92 K/UL — SIGNIFICANT CHANGE UP (ref 3.8–10.5)

## 2025-01-09 PROCEDURE — 99285 EMERGENCY DEPT VISIT HI MDM: CPT | Mod: GC

## 2025-01-09 PROCEDURE — 36415 COLL VENOUS BLD VENIPUNCTURE: CPT

## 2025-01-09 PROCEDURE — 95816 EEG AWAKE AND DROWSY: CPT

## 2025-01-09 PROCEDURE — 80053 COMPREHEN METABOLIC PANEL: CPT

## 2025-01-09 PROCEDURE — 85025 COMPLETE CBC W/AUTO DIFF WBC: CPT

## 2025-01-09 PROCEDURE — 83735 ASSAY OF MAGNESIUM: CPT

## 2025-01-09 PROCEDURE — 72125 CT NECK SPINE W/O DYE: CPT | Mod: 26

## 2025-01-09 PROCEDURE — 84146 ASSAY OF PROLACTIN: CPT

## 2025-01-09 PROCEDURE — 84100 ASSAY OF PHOSPHORUS: CPT

## 2025-01-09 PROCEDURE — 71045 X-RAY EXAM CHEST 1 VIEW: CPT | Mod: 26

## 2025-01-09 PROCEDURE — 82550 ASSAY OF CK (CPK): CPT

## 2025-01-09 PROCEDURE — 83605 ASSAY OF LACTIC ACID: CPT

## 2025-01-09 PROCEDURE — 70450 CT HEAD/BRAIN W/O DYE: CPT | Mod: 26

## 2025-01-09 PROCEDURE — 99222 1ST HOSP IP/OBS MODERATE 55: CPT

## 2025-01-09 PROCEDURE — 84443 ASSAY THYROID STIM HORMONE: CPT

## 2025-01-09 PROCEDURE — 93010 ELECTROCARDIOGRAM REPORT: CPT

## 2025-01-09 PROCEDURE — 81003 URINALYSIS AUTO W/O SCOPE: CPT

## 2025-01-09 RX ORDER — QUETIAPINE FUMARATE 100 MG/1
25 TABLET, FILM COATED ORAL ONCE
Refills: 0 | Status: COMPLETED | OUTPATIENT
Start: 2025-01-09 | End: 2025-01-09

## 2025-01-09 RX ORDER — SODIUM CHLORIDE 9 MG/ML
1000 INJECTION, SOLUTION INTRAMUSCULAR; INTRAVENOUS; SUBCUTANEOUS ONCE
Refills: 0 | Status: COMPLETED | OUTPATIENT
Start: 2025-01-09 | End: 2025-01-09

## 2025-01-09 RX ORDER — QUETIAPINE FUMARATE 100 MG/1
1 TABLET, FILM COATED ORAL
Refills: 0 | DISCHARGE

## 2025-01-09 RX ORDER — VORTIOXETINE 10 MG/1
10 TABLET, FILM COATED ORAL ONCE
Refills: 0 | Status: COMPLETED | OUTPATIENT
Start: 2025-01-09 | End: 2025-01-09

## 2025-01-09 RX ORDER — MAG HYDROX/ALUMINUM HYD/SIMETH 200-200-20
30 SUSPENSION, ORAL (FINAL DOSE FORM) ORAL EVERY 4 HOURS
Refills: 0 | Status: DISCONTINUED | OUTPATIENT
Start: 2025-01-09 | End: 2025-01-11

## 2025-01-09 RX ORDER — VORTIOXETINE 10 MG/1
1 TABLET, FILM COATED ORAL
Refills: 0 | DISCHARGE

## 2025-01-09 RX ORDER — LORAZEPAM 1 MG/1
0.5 TABLET ORAL ONCE
Refills: 0 | Status: DISCONTINUED | OUTPATIENT
Start: 2025-01-09 | End: 2025-01-09

## 2025-01-09 RX ORDER — APIXABAN 5 MG/1
5 TABLET, FILM COATED ORAL
Refills: 0 | Status: DISCONTINUED | OUTPATIENT
Start: 2025-01-09 | End: 2025-01-09

## 2025-01-09 RX ORDER — ONDANSETRON 4 MG/1
4 TABLET ORAL EVERY 8 HOURS
Refills: 0 | Status: DISCONTINUED | OUTPATIENT
Start: 2025-01-09 | End: 2025-01-11

## 2025-01-09 RX ORDER — MEMANTINE HYDROCHLORIDE 14 MG/1
1 CAPSULE, EXTENDED RELEASE ORAL
Refills: 0 | DISCHARGE

## 2025-01-09 RX ORDER — GINKGO BILOBA 40 MG
3 CAPSULE ORAL AT BEDTIME
Refills: 0 | Status: DISCONTINUED | OUTPATIENT
Start: 2025-01-09 | End: 2025-01-11

## 2025-01-09 RX ORDER — ACETAMINOPHEN 80 MG/.8ML
650 SOLUTION/ DROPS ORAL EVERY 6 HOURS
Refills: 0 | Status: DISCONTINUED | OUTPATIENT
Start: 2025-01-09 | End: 2025-01-11

## 2025-01-09 RX ORDER — DIVALPROEX SODIUM 500 MG/1
125 TABLET, DELAYED RELEASE ORAL ONCE
Refills: 0 | Status: COMPLETED | OUTPATIENT
Start: 2025-01-09 | End: 2025-01-09

## 2025-01-09 RX ADMIN — QUETIAPINE FUMARATE 25 MILLIGRAM(S): 100 TABLET, FILM COATED ORAL at 20:30

## 2025-01-09 RX ADMIN — VORTIOXETINE 10 MILLIGRAM(S): 10 TABLET, FILM COATED ORAL at 20:30

## 2025-01-09 RX ADMIN — APIXABAN 5 MILLIGRAM(S): 5 TABLET, FILM COATED ORAL at 20:30

## 2025-01-09 RX ADMIN — SODIUM CHLORIDE 1000 MILLILITER(S): 9 INJECTION, SOLUTION INTRAMUSCULAR; INTRAVENOUS; SUBCUTANEOUS at 16:02

## 2025-01-09 RX ADMIN — DIVALPROEX SODIUM 125 MILLIGRAM(S): 500 TABLET, DELAYED RELEASE ORAL at 20:30

## 2025-01-09 NOTE — H&P ADULT - HISTORY OF PRESENT ILLNESS
79-year-old female with history of cognitive decline with no previous formal diagnosis of seizures presents to the emergency department after being found on the floor of her bathroom by her aide who described general shaking movements.  It is unclear if she lost consciousness, she has a baseline mental status of AO x 0.  79F with h/o seizures and dementia was sent to  after having been found down on floor at home. I spoke with Renae over the phone, As per daughter pt has 24 hr aides. Patient had been in her usual state of health, she is able to ambulate to the bathroom on her own. Today the aide heard a "thump", and found her "seizing on the floor". By the time daughter arrived home EMS was also there and patient had stopped seizing. As per daughter, at baseline patient is able to answer "yes or no" to questions. In ED VSS, CBC/CMP unremarkable. ECG initially noted for Aflutter, patient later converted to NSR. Flu/COVID negative. Patient admitted for possible seizure.

## 2025-01-09 NOTE — ED ADULT TRIAGE NOTE - CHIEF COMPLAINT QUOTE
Pt BIBEMS from home found "shaking on the bathroom floor" by family per EMS. Pt has hx of seizures, dementia at baseline and Samoan speaking only. NA activated in EMS.

## 2025-01-09 NOTE — ED ADULT NURSE NOTE - NSFALLHARMRISKINTERV_ED_ALL_ED
Assistance OOB with selected safe patient handling equipment if applicable/Assistance with ambulation/Communicate risk of Fall with Harm to all staff, patient, and family/Monitor gait and stability/Monitor for mental status changes and reorient to person, place, and time, as needed/Move patient closer to nursing station/within visual sight of ED staff/Provide visual cue: red socks, yellow wristband, yellow gown, etc/Reinforce activity limits and safety measures with patient and family/Toileting schedule using arm’s reach rule for commode and bathroom/Use of alarms - bed, stretcher, chair and/or video monitoring/Bed in lowest position, wheels locked, appropriate side rails in place/Call bell, personal items and telephone in reach/Instruct patient to call for assistance before getting out of bed/chair/stretcher/Non-slip footwear applied when patient is off stretcher/[place] to call system/Physically safe environment - no spills, clutter or unnecessary equipment/Purposeful Proactive Rounding/Room/bathroom lighting operational, light cord in reach

## 2025-01-09 NOTE — ED PROVIDER NOTE - PHYSICAL EXAMINATION
General: not oriented to person, time, or place  Psych: mood appropriate  Head: normocephalic; atraumatic  Eyes: conjunctivae clear bilaterally, sclerae anicteric  ENT: no nasal flaring, patent nares  Cardio: non-tachycardic; skin warm and well perfused  Resp: normal respiratory effort; no accessory muscle use  GI: abdomen soft, nontender, nondistended  Neuro: normal sensation, moving all four extremities equally  Skin: No evidence of rash or bruising  MSK: cervical collar in place; no posterior neck tenderness to palpation  Lymph/Vasc: no LE edema

## 2025-01-09 NOTE — ED PROVIDER NOTE - PROGRESS NOTE DETAILS
Douglas Alicia MD: Patient found to be in new atrial flutter without rapid ventricular response, hemodynamically stable.  Patient to be admitted to the hospital. Progress note Case was discussed with cardiology, recommend starting Eliquis, n.p.o. after midnight with plan for possible BRAEDEN cardioversion tomorrow.

## 2025-01-09 NOTE — ED PROVIDER NOTE - ATTENDING CONTRIBUTION TO CARE
I, Shweta Jon DO,  performed the initial face to face bedside interview with this patient regarding history of present illness, review of symptoms and relevant past medical, social and family history.  I completed an independent physical examination.  I was the initial provider who evaluated this patient. I have signed out the follow up of any pending tests (i.e. labs, radiological studies) to the resident.  I have communicated the patient’s plan of care and disposition with the resident.

## 2025-01-09 NOTE — H&P ADULT - PROBLEM SELECTOR PLAN 1
As per daughter the patient becomes agitated, and unlikely to tolerate an EEG, if possible she would like to be informed 30 minutes prior to when the EEG will be done so she can come to keep patient calm.   last seizure 2022 as per daughter  On depakote for mood   Obtain lactate, CK, prolactin  Obtain UA to eval for infectious etiology  EEG  Neurology consult. As per daughter the patient becomes agitated, and unlikely to tolerate an EEG, if possible she would like to be informed 30 minutes prior to when the EEG will be done so she can come to keep patient calm.   last seizure 2022 as per daughter  On depakote for mood   Obtain lactate, CK, prolactin  Obtain UA to eval for infectious etiology  F/u TSH  EEG  Neurology consult.

## 2025-01-09 NOTE — H&P ADULT - PROBLEM SELECTOR PLAN 2
D/w daughter, she wants the patient to be more comfort focused, and would not like further evaluation of aflutter. Also would not like anticoagulation d/t risks of falls outweigh the benefit for stroke prophylaxis as the patient already has advanced dementia. Daughter would like to hold asa, DOAC, and no TTE.

## 2025-01-09 NOTE — ED PROVIDER NOTE - CLINICAL SUMMARY MEDICAL DECISION MAKING FREE TEXT BOX
In summary, this is an elderly female with moderate to severe dementia who was found on the floor due to either a mechanical fall, syncope, or a seizure of unclear etiology.  Exam does not demonstrate any significant deformities, patient is able to follow simple commands from her daughter in Faroese.  A stat CT of the head and neck was obtained, no acute fracture or intracranial hemorrhage.  Cervical collar was removed due to patient with persistent nontender neck and ability to use her extremities.  Given this is still possibly due to syncope, patient requires further inpatient management.  In the interim, we will perform a toxic/metabolic workup.

## 2025-01-09 NOTE — PATIENT PROFILE ADULT - NSPROPTRIGHTNOTIFYOBTAINDET_GEN_A_NUR
Pt was here for her 1 wk dressing removal after C/S- Bandage was removed with ease- incision was intact- dry blood noted on right side of incision and middle left side- incision was closed and not visible areas open- bruising noted on left side above the incision on the abdomen- pt was informed- incision was cleaned with peroxide and steri strips were applied- pt was instructed to keep area clean and dry, showers only no baths, swimming- lifting restrictions in place- if she becomes concerned at any time to notify the office- did inform steri strips may stay on for the next 1-2 weeks- pt rto in 1 week for 2 wk pp visit which patient stated she is already scheduled for. pt confused

## 2025-01-09 NOTE — H&P ADULT - PROBLEM SELECTOR PLAN 3
C/w her home meds.  Soft diet, crush medicine and give with chocolate pudding.   Fall and aspiration precautions  Delirium precautions.

## 2025-01-09 NOTE — H&P ADULT - NSHPPHYSICALEXAM_GEN_ALL_CORE
T(C): 36.7 (01-09-25 @ 19:00), Max: 36.9 (01-09-25 @ 14:17)  HR: 75 (01-09-25 @ 18:34) (74 - 75)  BP: 147/68 (01-09-25 @ 18:34) (126/73 - 147/68)  RR: 18 (01-09-25 @ 18:34) (18 - 18)  SpO2: 99% (01-09-25 @ 18:34) (94% - 99%)    General: non-toxic  HEENT: non-traumatic, perrla, eomi  Cardio: s1s2 regular rate and rhythm  Lungs: comfortable breathing, clear to auscultation  Abdomen: Soft, non-tender, non-distended  Neuro: AOx4  Ext: Pulses +2

## 2025-01-09 NOTE — PATIENT PROFILE ADULT - FALL HARM RISK - HARM RISK INTERVENTIONS

## 2025-01-09 NOTE — PATIENT PROFILE ADULT - NS PRO AD NO ADVANCE DIRECTIVE
No Area M Indication Text: Tumors in this location are included in Area M (cheek, forehead, scalp, neck, jawline and pretibial skin).  Mohs surgery is indicated for tumors 1 cm or larger in these anatomic locations.

## 2025-01-09 NOTE — ED ADULT NURSE NOTE - CHIEF COMPLAINT QUOTE
Pt BIBEMS from home found "shaking on the bathroom floor" by family per EMS. Pt has hx of seizures, dementia at baseline and Belarusian speaking only. NA activated in EMS. Detail Level: Detailed

## 2025-01-09 NOTE — H&P ADULT - CONVERSATION DETAILS
I discussed GOC with daughter Renae, as per daughter patient has a DNR/DNI w/ trial of NIPPV, she would like to maintain the same GOC, and will bring in the MOLST form tomorrow.

## 2025-01-09 NOTE — ED ADULT NURSE NOTE - OBJECTIVE STATEMENT
pt presents to ER s/p seizure like activity. daughter at bedside reports pt was found on her bathroom floor by the aid, unwitnessed and states "the aid said she was having shaky movements". daughter reports pt was treated for a seizure in the past. hx dementia, A&Ox0 at baseline, [place]ican speaking. pt denies any medical complaints.

## 2025-01-09 NOTE — ED PROVIDER NOTE - OBJECTIVE STATEMENT
79-year-old female with history of cognitive decline with no previous formal diagnosis of seizures presents to the emergency department after being found on the floor of her bathroom by her aide who described general shaking movements.  It is unclear if she lost consciousness, she has a baseline mental status of AO x 0.  She is not on anticoagulation.  The daughter reports that she has not had any recent cough, fevers, vomiting.  She was seen in our health system approximately 2-1/2 years ago for a previous seizure that was presumed to be due to a new medication she had started for cognitive enhancement.

## 2025-01-10 ENCOUNTER — TRANSCRIPTION ENCOUNTER (OUTPATIENT)
Age: 80
End: 2025-01-10

## 2025-01-10 LAB
ALBUMIN SERPL ELPH-MCNC: 3.2 G/DL — LOW (ref 3.3–5)
ALP SERPL-CCNC: 93 U/L — SIGNIFICANT CHANGE UP (ref 40–120)
ALT FLD-CCNC: 11 U/L — LOW (ref 12–78)
ANION GAP SERPL CALC-SCNC: 3 MMOL/L — LOW (ref 5–17)
APPEARANCE UR: CLEAR — SIGNIFICANT CHANGE UP
AST SERPL-CCNC: 22 U/L — SIGNIFICANT CHANGE UP (ref 15–37)
BASOPHILS # BLD AUTO: 0.04 K/UL — SIGNIFICANT CHANGE UP (ref 0–0.2)
BASOPHILS NFR BLD AUTO: 0.7 % — SIGNIFICANT CHANGE UP (ref 0–2)
BILIRUB SERPL-MCNC: 0.4 MG/DL — SIGNIFICANT CHANGE UP (ref 0.2–1.2)
BILIRUB UR-MCNC: NEGATIVE — SIGNIFICANT CHANGE UP
BUN SERPL-MCNC: 19 MG/DL — SIGNIFICANT CHANGE UP (ref 7–23)
CALCIUM SERPL-MCNC: 8.5 MG/DL — SIGNIFICANT CHANGE UP (ref 8.5–10.1)
CHLORIDE SERPL-SCNC: 110 MMOL/L — HIGH (ref 96–108)
CK SERPL-CCNC: 164 U/L — SIGNIFICANT CHANGE UP (ref 26–192)
CO2 SERPL-SCNC: 25 MMOL/L — SIGNIFICANT CHANGE UP (ref 22–31)
COLOR SPEC: YELLOW — SIGNIFICANT CHANGE UP
CREAT SERPL-MCNC: 0.83 MG/DL — SIGNIFICANT CHANGE UP (ref 0.5–1.3)
DIFF PNL FLD: NEGATIVE — SIGNIFICANT CHANGE UP
EGFR: 72 ML/MIN/1.73M2 — SIGNIFICANT CHANGE UP
EOSINOPHIL # BLD AUTO: 0.12 K/UL — SIGNIFICANT CHANGE UP (ref 0–0.5)
EOSINOPHIL NFR BLD AUTO: 2.2 % — SIGNIFICANT CHANGE UP (ref 0–6)
GLUCOSE SERPL-MCNC: 93 MG/DL — SIGNIFICANT CHANGE UP (ref 70–99)
GLUCOSE UR QL: NEGATIVE MG/DL — SIGNIFICANT CHANGE UP
HCT VFR BLD CALC: 37 % — SIGNIFICANT CHANGE UP (ref 34.5–45)
HGB BLD-MCNC: 11.7 G/DL — SIGNIFICANT CHANGE UP (ref 11.5–15.5)
IMM GRANULOCYTES NFR BLD AUTO: 0.2 % — SIGNIFICANT CHANGE UP (ref 0–0.9)
KETONES UR-MCNC: ABNORMAL MG/DL
LACTATE SERPL-SCNC: 1.5 MMOL/L — SIGNIFICANT CHANGE UP (ref 0.7–2)
LACTATE SERPL-SCNC: 2.1 MMOL/L — HIGH (ref 0.7–2)
LEUKOCYTE ESTERASE UR-ACNC: NEGATIVE — SIGNIFICANT CHANGE UP
LYMPHOCYTES # BLD AUTO: 1.83 K/UL — SIGNIFICANT CHANGE UP (ref 1–3.3)
LYMPHOCYTES # BLD AUTO: 33.6 % — SIGNIFICANT CHANGE UP (ref 13–44)
MAGNESIUM SERPL-MCNC: 2.3 MG/DL — SIGNIFICANT CHANGE UP (ref 1.6–2.6)
MCHC RBC-ENTMCNC: 30.6 PG — SIGNIFICANT CHANGE UP (ref 27–34)
MCHC RBC-ENTMCNC: 31.6 G/DL — LOW (ref 32–36)
MCV RBC AUTO: 96.9 FL — SIGNIFICANT CHANGE UP (ref 80–100)
MONOCYTES # BLD AUTO: 0.88 K/UL — SIGNIFICANT CHANGE UP (ref 0–0.9)
MONOCYTES NFR BLD AUTO: 16.1 % — HIGH (ref 2–14)
NEUTROPHILS # BLD AUTO: 2.57 K/UL — SIGNIFICANT CHANGE UP (ref 1.8–7.4)
NEUTROPHILS NFR BLD AUTO: 47.2 % — SIGNIFICANT CHANGE UP (ref 43–77)
NITRITE UR-MCNC: NEGATIVE — SIGNIFICANT CHANGE UP
PH UR: 5.5 — SIGNIFICANT CHANGE UP (ref 5–8)
PHOSPHATE SERPL-MCNC: 3.1 MG/DL — SIGNIFICANT CHANGE UP (ref 2.5–4.5)
PLATELET # BLD AUTO: SIGNIFICANT CHANGE UP K/UL (ref 150–400)
POTASSIUM SERPL-MCNC: 4.8 MMOL/L — SIGNIFICANT CHANGE UP (ref 3.5–5.3)
POTASSIUM SERPL-SCNC: 4.8 MMOL/L — SIGNIFICANT CHANGE UP (ref 3.5–5.3)
PROLACTIN SERPL-MCNC: 6.5 NG/ML — SIGNIFICANT CHANGE UP (ref 3.4–24.1)
PROT SERPL-MCNC: 7.3 GM/DL — SIGNIFICANT CHANGE UP (ref 6–8.3)
PROT UR-MCNC: NEGATIVE MG/DL — SIGNIFICANT CHANGE UP
RBC # BLD: 3.82 M/UL — SIGNIFICANT CHANGE UP (ref 3.8–5.2)
RBC # FLD: 14.1 % — SIGNIFICANT CHANGE UP (ref 10.3–14.5)
SODIUM SERPL-SCNC: 138 MMOL/L — SIGNIFICANT CHANGE UP (ref 135–145)
SP GR SPEC: 1.02 — SIGNIFICANT CHANGE UP (ref 1–1.03)
TSH SERPL-MCNC: 4.14 UU/ML — SIGNIFICANT CHANGE UP (ref 0.34–4.82)
UROBILINOGEN FLD QL: 0.2 MG/DL — SIGNIFICANT CHANGE UP (ref 0.2–1)
WBC # BLD: 5.45 K/UL — SIGNIFICANT CHANGE UP (ref 3.8–10.5)
WBC # FLD AUTO: 5.45 K/UL — SIGNIFICANT CHANGE UP (ref 3.8–10.5)

## 2025-01-10 PROCEDURE — 99233 SBSQ HOSP IP/OBS HIGH 50: CPT

## 2025-01-10 PROCEDURE — 99497 ADVNCD CARE PLAN 30 MIN: CPT | Mod: 25

## 2025-01-10 PROCEDURE — 99222 1ST HOSP IP/OBS MODERATE 55: CPT | Mod: FS

## 2025-01-10 PROCEDURE — 99223 1ST HOSP IP/OBS HIGH 75: CPT

## 2025-01-10 PROCEDURE — 99223 1ST HOSP IP/OBS HIGH 75: CPT | Mod: FS

## 2025-01-10 PROCEDURE — 95816 EEG AWAKE AND DROWSY: CPT | Mod: 26

## 2025-01-10 RX ORDER — DIVALPROEX SODIUM 500 MG/1
125 TABLET, DELAYED RELEASE ORAL ONCE
Refills: 0 | Status: COMPLETED | OUTPATIENT
Start: 2025-01-10 | End: 2025-01-10

## 2025-01-10 RX ORDER — DIVALPROEX SODIUM 500 MG/1
500 TABLET, DELAYED RELEASE ORAL AT BEDTIME
Refills: 0 | Status: DISCONTINUED | OUTPATIENT
Start: 2025-01-10 | End: 2025-01-11

## 2025-01-10 RX ADMIN — DIVALPROEX SODIUM 500 MILLIGRAM(S): 500 TABLET, DELAYED RELEASE ORAL at 21:00

## 2025-01-10 RX ADMIN — DIVALPROEX SODIUM 125 MILLIGRAM(S): 500 TABLET, DELAYED RELEASE ORAL at 14:57

## 2025-01-10 NOTE — DISCHARGE NOTE PROVIDER - NSDCMRMEDTOKEN_GEN_ALL_CORE_FT
divalproex sodium 125 mg oral delayed release tablet: 1 tab(s) orally 3 times a day *** 7 am, 3 pm, 9:15 pm***  memantine 10 mg oral tablet: 1 tab(s) orally 2 times a day ***7:30 am, 3 pm***  QUEtiapine 50 mg oral tablet: 1 tab(s) orally once a day *** 9:15 pm***  Trintellix 10 mg oral tablet: 1 tab(s) orally once a day ***9:15 pm***   divalproex sodium 500 mg oral tablet, extended release: 1 tab(s) orally once a day (at bedtime)  memantine 10 mg oral tablet: 1 tab(s) orally 2 times a day ***7:30 am, 3 pm***  QUEtiapine 50 mg oral tablet: 1 tab(s) orally once a day *** 9:15 pm***  Trintellix 10 mg oral tablet: 1 tab(s) orally once a day ***9:15 pm***

## 2025-01-10 NOTE — CONSULT NOTE ADULT - SUBJECTIVE AND OBJECTIVE BOX
CHIEF COMPLAINT: syncope     HPI:  Ms. Lemon is a 78 yo female with a hx seizures and dementia was sent to  after having been found down on floor at home. Per chart review, pt lives with daughter and has 24 hr aides. She was in her usual state of health when found down - with seizure like activity. In the ED, pt was found to be in atrial flutter for which cardiology was consulted. Spontaneously converted to NSR.     Per palliative discussion with daughter, she declines medical therapy for atrial flutter including anticoagulation. No invasive measures; would like to bring her home today.       PAST MEDICAL / SURGICAL HISTORY:  PAST MEDICAL & SURGICAL HISTORY:  No pertinent past medical history      No significant past surgical history          SOCIAL HISTORY:   Alcohol: Denied  Smoking: Nonsmoker  Drug Use: Denied  Marital Status:     FAMILY HISTORY: FAMILY HISTORY:      MEDICATIONS  (STANDING):  divalproex  milliGRAM(s) Oral once  divalproex  milliGRAM(s) Oral at bedtime    MEDICATIONS  (PRN):  acetaminophen     Tablet .. 650 milliGRAM(s) Oral every 6 hours PRN Temp greater or equal to 38C (100.4F), Mild Pain (1 - 3)  aluminum hydroxide/magnesium hydroxide/simethicone Suspension 30 milliLiter(s) Oral every 4 hours PRN Dyspepsia  melatonin 3 milliGRAM(s) Oral at bedtime PRN Insomnia  ondansetron Injectable 4 milliGRAM(s) IV Push every 8 hours PRN Nausea and/or Vomiting      Allergies    strawberry (Unknown)  No Known Drug Allergies    Intolerances        REVIEW OF SYSTEMS:  unable to obtain due to mental status     VITAL SIGNS:   Vital Signs Last 24 Hrs  T(C): 37.1 (10 Julian 2025 05:32), Max: 37.1 (10 Julian 2025 05:32)  T(F): 98.8 (10 Julian 2025 05:32), Max: 98.8 (10 Julian 2025 05:32)  HR: 52 (10 Julian 2025 05:32) (52 - 89)  BP: 137/84 (10 Julian 2025 05:32) (130/52 - 148/92)  BP(mean): 71 (09 Jan 2025 21:07) (71 - 89)  RR: 19 (10 Julian 2025 05:32) (16 - 19)  SpO2: 99% (10 Julian 2025 05:32) (99% - 99%)    Parameters below as of 10 Julian 2025 05:32  Patient On (Oxygen Delivery Method): room air        I&O's Summary    09 Jan 2025 07:01  -  10 Julian 2025 07:00  --------------------------------------------------------  IN: 0 mL / OUT: 360 mL / NET: -360 mL        PHYSICAL EXAM:  Constitutional: NAD, awake, confused   HEENT:  EOMI,  Pupils round, No oral cyanosis.  Pulmonary: Non-labored, breath sounds are clear bilaterally, No wheezing, rales or rhonchi  Cardiovascular: S1 and S2 bradycardic, systolic murmur   Gastrointestinal: soft, nontender.   Lymph: No peripheral edema. No cervical lymphadenopathy.  Neurological: disoriented        LABS:                        11.7   5.45  )-----------( See note    ( 10 Julian 2025 07:43 )             37.0                         12.9   7.92  )-----------( 211      ( 09 Jan 2025 15:52 )             40.6     10 Julian 2025 07:43    138    |  110    |  19     ----------------------------<  93     4.8     |  25     |  0.83   09 Jan 2025 15:52    139    |  107    |  24     ----------------------------<  121    3.7     |  28     |  0.96     Ca    8.5        10 Julian 2025 07:43  Ca    9.0        09 Jan 2025 15:52  Phos  3.1       10 Julian 2025 07:43  Mg     2.3       10 Julian 2025 07:43    TPro  7.3    /  Alb  3.2    /  TBili  0.4    /  DBili  x      /  AST  22     /  ALT  11     /  AlkPhos  93     10 Julian 2025 07:43  TPro  8.1    /  Alb  3.8    /  TBili  0.2    /  DBili  x      /  AST  16     /  ALT  14     /  AlkPhos  104    09 Jan 2025 15:52            01-10 @ 07:43  TSH: 4.14

## 2025-01-10 NOTE — CONSULT NOTE ADULT - SUBJECTIVE AND OBJECTIVE BOX
Patient is a 79y old  Female who presents with a chief complaint of     HPI:   80 yo German speaking F with h/o seizures since 8/12/22 thought to be secondary to Olanzapine/Lybalvi, which was discontinued, and Depakote was increased at that time, advanced dementia was sent to  after having been found down on floor at home. Patient had been in her usual state of health, and was able to ambulate to the bathroom on her own.  Yesterday the aide heard a "thump", and found her "seizing on the floor". By the time daughter arrived home EMS was also there and patient had stopped seizing.  In ED, patient was noted to be in Aflutter.        PAST MEDICAL & SURGICAL HISTORY:  Dementia  Seizures      No significant past surgical history          FAMILY HISTORY:      Social Hx:     MEDICATIONS  (STANDING):       Allergies  strawberry (Unknown)  No Known Drug Allergies        ROS:     Vital Signs Last 24 Hrs  T(C): 37.1 (10 Julian 2025 05:32), Max: 37.1 (10 Julian 2025 05:32)  T(F): 98.8 (10 Julian 2025 05:32), Max: 98.8 (10 Julian 2025 05:32)  HR: 52 (10 Julian 2025 05:32) (52 - 89)  BP: 137/84 (10 Julian 2025 05:32) (126/73 - 148/92)  BP(mean): 71 (09 Jan 2025 21:07) (71 - 89)  RR: 19 (10 Julian 2025 05:32) (16 - 19)  SpO2: 99% (10 Julian 2025 05:32) (94% - 99%)    Parameters below as of 10 Julian 2025 05:32  Patient On (Oxygen Delivery Method): room air        INCOMPLETE  Constitutional: awake and alert.  HEENT: PERRLA, EOMI,   Neck: Supple.  Respiratory: Breath sounds are clear bilaterally  Cardiovascular: S1 and S2, regular / irregular rhythm  Gastrointestinal: soft, nontender  Extremities:  no edema  Vascular: Caritid Bruit - no  Musculoskeletal: no joint swelling/tenderness, no abnormal movements  Skin: No rashes    Neurological exam:  HF: A x O x 3. Appropriately interactive, normal affect. Speech fluent, No Aphasia or paraphasic errors. Naming /repetition intact   CN: JEFF, EOMI, VFF, facial sensation normal, no NLFD, tongue midline, Palate moves equally, SCM equal bilaterally  Motor: No pronator drift, Strength 5/5 in all 4 ext, normal bulk and tone, no tremor, rigidity or bradykinesia.    Sens: Intact to light touch / PP/ VS/ JS    Reflexes: Symmetric and normal . BJ 2+, BR 2+, KJ 2+, AJ 2+, downgoing toes b/l  Coord:  No FNFA, dysmetria, TESSIE intact   Gait/Balance: Normal/Cannot test    NIHSS:          Labs:   01-09    139  |  107  |  24[H]  ----------------------------<  121[H]  3.7   |  28  |  0.96    Ca    9.0      09 Jan 2025 15:52    TPro  8.1  /  Alb  3.8  /  TBili  0.2  /  DBili  x   /  AST  16  /  ALT  14  /  AlkPhos  104  01-09                              11.7   5.45  )-----------( See note    ( 10 Julian 2025 07:43 )             37.0       Radiology:  < from: CT Head No Cont (01.09.25 @ 14:29) >    BRAIN  IMPRESSION:    1)  chronic ischemic changes with atrophy. No acute abnormality suggested.  2)  no intracerebral hemorrhage, contusion, or extracerebral hemorrhagic   collections identified.    CERVICAL IMPRESSION:    Multilevel degenerative changes. No acute fracture identified.         CC: breakthrough seizure    HPI:   78 yo Malaysian speaking F with h/o seizures since 8/12/22 thought to be secondary to Olanzapine/Lybalvi, which was discontinued, and Depakote was increased at that time, advanced dementia was sent to  after having been found down on floor at home. Patient had been in her usual state of health, and was able to ambulate to the bathroom on her own.  Yesterday the aide heard a "thump", and found her "seizing on the floor". By the time daughter arrived home EMS was also there and patient had stopped seizing.  In ED, patient was noted to be in Aflutter.        PAST MEDICAL & SURGICAL HISTORY:  Dementia  Seizures      No significant past surgical history          FAMILY HISTORY:      Social Hx:     MEDICATIONS  (STANDING):       Allergies  strawberry (Unknown)  No Known Drug Allergies        ROS:     Vital Signs Last 24 Hrs  T(C): 37.1 (10 Julian 2025 05:32), Max: 37.1 (10 Julian 2025 05:32)  T(F): 98.8 (10 Julian 2025 05:32), Max: 98.8 (10 Julian 2025 05:32)  HR: 52 (10 Julian 2025 05:32) (52 - 89)  BP: 137/84 (10 Julian 2025 05:32) (126/73 - 148/92)  BP(mean): 71 (09 Jan 2025 21:07) (71 - 89)  RR: 19 (10 Julian 2025 05:32) (16 - 19)  SpO2: 99% (10 Julian 2025 05:32) (94% - 99%)    Parameters below as of 10 Julian 2025 05:32  Patient On (Oxygen Delivery Method): room air        INCOMPLETE  Constitutional: awake and alert.  HEENT: PERRLA, EOMI,   Neck: Supple.  Respiratory: Breath sounds are clear bilaterally  Cardiovascular: S1 and S2, regular / irregular rhythm  Gastrointestinal: soft, nontender  Extremities:  no edema  Vascular: Caritid Bruit - no  Musculoskeletal: no joint swelling/tenderness, no abnormal movements  Skin: No rashes    Neurological exam:  HF: A x O x 3. Appropriately interactive, normal affect. Speech fluent, No Aphasia or paraphasic errors. Naming /repetition intact   CN: JEFF, EOMI, VFF, facial sensation normal, no NLFD, tongue midline, Palate moves equally, SCM equal bilaterally  Motor: No pronator drift, Strength 5/5 in all 4 ext, normal bulk and tone, no tremor, rigidity or bradykinesia.    Sens: Intact to light touch / PP/ VS/ JS    Reflexes: Symmetric and normal . BJ 2+, BR 2+, KJ 2+, AJ 2+, downgoing toes b/l  Coord:  No FNFA, dysmetria, TESSIE intact   Gait/Balance: Normal/Cannot test    NIHSS:          Labs:   01-09    139  |  107  |  24[H]  ----------------------------<  121[H]  3.7   |  28  |  0.96    Ca    9.0      09 Jan 2025 15:52    TPro  8.1  /  Alb  3.8  /  TBili  0.2  /  DBili  x   /  AST  16  /  ALT  14  /  AlkPhos  104  01-09                              11.7   5.45  )-----------( See note    ( 10 Julian 2025 07:43 )             37.0       Radiology:  < from: CT Head No Cont (01.09.25 @ 14:29) >    BRAIN  IMPRESSION:    1)  chronic ischemic changes with atrophy. No acute abnormality suggested.  2)  no intracerebral hemorrhage, contusion, or extracerebral hemorrhagic   collections identified.    CERVICAL IMPRESSION:    Multilevel degenerative changes. No acute fracture identified.         CC: breakthrough seizure    HPI:   78 yo Solomon Islander speaking F with h/o seizures since 8/12/22 thought to be secondary to Olanzapine/Lybalvi, which was discontinued, and Depakote was increased at that time, advanced dementia, night-time incontinence was sent to  after having been found down on floor at home. Patient had been in her usual state of health, and was able to ambulate to the bathroom on her own.  Yesterday the aide heard a "thump", and found her "seizing on the floor". By the time daughter arrived home EMS was also there and patient had stopped seizing.  In ED, patient was noted to be in Aflutter.  I spoke to patient's daughter re seizure follow up and medication.  Daughter Renae states that since the seizure in 8/2022 patient has not had another seizure and has not seen a Neurologist.  Her depakote was reduced 2/2 SE of drowsiness by her psychiatrist Dr. Trujillo.  Aide at bedside describes this event as patient was shaking and drooling and rigid for 5 minutes and was unresponsive.  Daughter found diaper full of urine, no tongue bite, no LOC, no facial droop.        PAST MEDICAL & SURGICAL HISTORY:  Dementia  Seizures        FAMILY HISTORY: non-contributory      Social Hx: Denies ETOH, Smoking, drug use    MEDICATIONS  (STANDING):  Trintellix  Depakote 125mg TID  Seroquel 25mg QHS     Allergies  strawberry (Unknown)  No Known Drug Allergies        ROS: unable to obtain    Vital Signs Last 24 Hrs  T(C): 37.1 (10 Julian 2025 05:32), Max: 37.1 (10 Julian 2025 05:32)  T(F): 98.8 (10 Julian 2025 05:32), Max: 98.8 (10 Julian 2025 05:32)  HR: 52 (10 Julian 2025 05:32) (52 - 89)  BP: 137/84 (10 Julian 2025 05:32) (126/73 - 148/92)  BP(mean): 71 (09 Jan 2025 21:07) (71 - 89)  RR: 19 (10 Julian 2025 05:32) (16 - 19)  SpO2: 99% (10 Julian 2025 05:32) (94% - 99%)    Parameters below as of 10 Julian 2025 05:32  Patient On (Oxygen Delivery Method): room air      GEN:   Constitutional: awake, NAD  HEAD: Normocephalic, EEG leads on  Neck: Not allowing me to check  Extremities:  no edema  Musculoskeletal: no abnormal movements  Skin: No rashes    Neurological exam:  LIMITED, not cooperative  HF: Aware, not oriented, limited verbal output, not cooperative with exam but did get her to smile, raise both arms and squeeze my fingers  CN: JEFF,  VFF, facial sensation normal, no NLFD, does not stick out tongue  Motor: No pronator drift UE's, moving all extremities, no tremors observed  Sens: withdraws to tactile stimulation    Reflexes: Not allowing.  Can get aggressive   Coord:  Not cooperating  Gait/Balance: Cannot test          Labs:   01-09    139  |  107  |  24[H]  ----------------------------<  121[H]  3.7   |  28  |  0.96    Ca    9.0      09 Jan 2025 15:52    TPro  8.1  /  Alb  3.8  /  TBili  0.2  /  DBili  x   /  AST  16  /  ALT  14  /  AlkPhos  104  01-09                              11.7   5.45  )-----------( See note    ( 10 Julian 2025 07:43 )             37.0       Radiology:  < from: CT Head No Cont (01.09.25 @ 14:29) >    BRAIN  IMPRESSION:    1)  chronic ischemic changes with atrophy. No acute abnormality suggested.  2)  no intracerebral hemorrhage, contusion, or extracerebral hemorrhagic   collections identified.    CERVICAL IMPRESSION:    Multilevel degenerative changes. No acute fracture identified.

## 2025-01-10 NOTE — GOALS OF CARE CONVERSATION - ADVANCED CARE PLANNING - CONVERSATION DETAILS
HPI:   79F with h/o seizures and dementia was sent to  after having been found down on floor at home. I spoke with Renae over the phone, As per daughter pt has 24 hr aides. Patient had been in her usual state of health, she is able to ambulate to the bathroom on her own. Today the aide heard a "thump", and found her "seizing on the floor". By the time daughter arrived home EMS was also there and patient had stopped seizing. As per daughter, at baseline patient is able to answer "yes or no" to questions. In ED VSS, CBC/CMP unremarkable. ECG initially noted for Aflutter, patient later converted to NSR. Flu/COVID negative. Patient admitted for possible seizure.  (09 Jan 2025 20:31)      PERTINENT PMH REVIEWED:  [ x ] YES [ ] NO           Primary Contact:        stephanie Macdonald, phone # [phone]    HCP [  ] Surrogate [  x ] Guardian [   ]    Mental Status: [ ] Alert  [  ] Oriented [ x ] Confused [  ] Lethargic  Concerns of Depression [  ] -not identified  Anxiety [   ] -not identified  Baseline ADLs (prior to admission):  Independent [ ] moderately [ ] fully   Dependent   [ ] moderately [ x ]fully    Family Meeting attendees: GOC discussed    Anticipated Grief: Patient[  ] Family [ x ]    Caregiver [place] Assessed: Yes [ x ] No [  ]    Mandaen: Sabianism    Spiritual Concerns: Not identified,  available for support.    Goals of Care: Current medical management     Previous Services: 24/7 aide    ADVANCE DIRECTIVES:    -Pt lacks capacity  -Daughter is surrogate decision maker. no siblings  -MOLST reviewed and confirmed, DNR/DNI, limited interventions, no feeding tube, continue antibiotics and fluids    Anticipated D/C Plan: return home with daughter                     Summary:  Palliative SW spoke with daughterRenae via telephone to discuss GOC, assist with planning and provide supportive counseling.  Palliative role explained.  Emotional support provided.  Pt. lacks capacity.  Prior to hospitalization, Pt resides at home with 24/7 aide services.  Pt requires assistance with ADLs.  She states her mom will answer yes or no questions in Malawian, reports her mom understands simple english but will respond in [place]awian and does not always have an appropriate response.  Pt resides in daughters home.  Daughters feelings explored.  Support provided.    Daughter shared what she has discussed with the medical team.  She states she is awaiting Pts urine results and the neurologist to call but desires for Pt to return home as soon as possible.  She is hopeful Pt will be able to return home today as being home is most familiar to her.  This SW discussed the option to return home with the support of home hospice to prevent future hospitalizations.  We discussed changing the focus to comfort with the support of hospice to stop medical works ups, hospitalizations, blood work, xrays, etc to treat symptoms as they arise at home. The philosophy of hospice and the services provided at home reviewed.  All questions addressed. Daughter grateful for this information and may desire a hospice referral in the future but at this time is not ready for a comfort focus.    Advance directives reviewed.  Daughter is surrogate decision maker.  She has no siblings.  MOLST reviewed and confirmed.  MOLST reflects DNR/DNI, limited interventions, no feeding tube, continue antibiotics and fluids.  Daughter states she does not want any aggressive interventions such as resuscitation, intubation or a feeding tube as it would not be a quality of life to her mom.  She is in agreement with antibiotics and fluids.      Plan to return home with daughter and aide services.  Emotional support provided.  Our team to continue to follow.

## 2025-01-10 NOTE — CONSULT NOTE ADULT - SUBJECTIVE AND OBJECTIVE BOX
HPI:     " 79F with h/o seizures and dementia was sent to  after having been found down on floor at home. I spoke with Renae over the phone, As per daughter pt has 24 hr aides. Patient had been in her usual state of health, she is able to ambulate to the bathroom on her own. Today the aide heard a "thump", and found her "seizing on the floor". By the time daughter arrived home EMS was also there and patient had stopped seizing. As per daughter, at baseline patient is able to answer "yes or no" to questions. In ED VSS, CBC/CMP unremarkable. ECG initially noted for Aflutter, patient later converted to NSR. Flu/COVID negative. Patient admitted for possible seizure. "     1/10  pt seen and examined  without capacity  pt not verbal at baseline as per aide  pt found  on floor w/ possible seizure--   Primary team did have goc discussion confirming dnr dni w/ trial of nippv   MOLST was on chart- order placed and molst confirmed    Our team will reach out to family patient is Hospice appropriate if that is the route family is ready for and a more comfort focus.      PAIN: ( ) YES  ( X)  NO  Pt unable to characterise d/t clinical status     DYSPNEA ( ) Yes ( X) No  Patient unable to characterise d/t clinical status     PAST MEDICAL & SURGICAL HISTORY:  No pertinent past medical history      No significant past surgical history          SOCIAL HX:    Hx opiate tolerance ( )YES  ( x)NO    Baseline ADLs  (Prior to Admission)  ( ) Independent   ( x)Dependent    FAMILY HISTORY:      Review of Systems:     Unable to obtain/Limited due to: nonverbal      PHYSICAL EXAM:    Vital Signs Last 24 Hrs  T(C): 37.1 (10 Julian 2025 05:32), Max: 37.1 (10 Julian 2025 05:32)  T(F): 98.8 (10 Julian 2025 05:32), Max: 98.8 (10 Julian 2025 05:32)  HR: 52 (10 Julian 2025 05:32) (52 - 89)  BP: 137/84 (10 Julian 2025 05:32) (126/73 - 148/92)  BP(mean): 71 (09 Jan 2025 21:07) (71 - 89)  RR: 19 (10 Julian 2025 05:32) (16 - 19)  SpO2: 99% (10 Julian 2025 05:32) (94% - 99%)    Parameters below as of 10 Julian 2025 05:32  Patient On (Oxygen Delivery Method): room air      Daily     Daily     PPSV2: 30  %  FAST:    General: calm in NAD  Mental Status: awake  nonverbal-  made eyecontact  HEENT: eomi, perrl  Lungs: ctabl b/l bs  Cardiac: s1s2 no mgr  GI: soft nontender +BS  : voids  Ext: no swelling or erythema  Neuro: advanced dementia    LABS:                        11.7   5.45  )-----------( See note    ( 10 Julian 2025 07:43 )             37.0     01-10    138  |  110[H]  |  19  ----------------------------<  93  4.8   |  25  |  0.83    Ca    8.5      10 Julian 2025 07:43  Phos  3.1     01-10  Mg     2.3     01-10    TPro  7.3  /  Alb  3.2[L]  /  TBili  0.4  /  DBili  x   /  AST  22  /  ALT  11[L]  /  AlkPhos  93  01-10      Albumin: Albumin: 3.2 g/dL (01-10 @ 07:43)      Allergies    strawberry (Unknown)  No Known Drug Allergies    Intolerances      MEDICATIONS  (STANDING):    MEDICATIONS  (PRN):  acetaminophen     Tablet .. 650 milliGRAM(s) Oral every 6 hours PRN Temp greater or equal to 38C (100.4F), Mild Pain (1 - 3)  aluminum hydroxide/magnesium hydroxide/simethicone Suspension 30 milliLiter(s) Oral every 4 hours PRN Dyspepsia  melatonin 3 milliGRAM(s) Oral at bedtime PRN Insomnia  ondansetron Injectable 4 milliGRAM(s) IV Push every 8 hours PRN Nausea and/or Vomiting      RADIOLOGY/ADDITIONAL STUDIES:

## 2025-01-10 NOTE — CONSULT NOTE ADULT - NS ATTEND AMEND GEN_ALL_CORE FT
Ms. Lemon was seen in August 2022 after presenting to the ED with a seizure. She was on Depakote 250 mg hs at that time for behavior.  The dose was increased to 250 mg BID.  She has been seizure free since that time and the dose at some point was reduced to 125 mg TID to help with drowsiness.  She now presents after a second seizure and was found to be in a-flutter.  Routine EEG shows mild generalized slowing, no epileptiform activity.  -Will increase standing dose back to 500 mg/day but will change to Depakote  mg all at night.   -Additional 125 mg of Depakote to be given this AM.    Dr. Sandoval will cover neurology beginning 1/11 and can f/u if needed.

## 2025-01-10 NOTE — DISCHARGE NOTE PROVIDER - NSDCCPCAREPLAN_GEN_ALL_CORE_FT
PRINCIPAL DISCHARGE DIAGNOSIS  Diagnosis: Syncope  Assessment and Plan of Treatment: Your presented with seizure activity. Your medications were adjusted to 500mg qhs and you responded well.         SECONDARY DISCHARGE DIAGNOSES  Diagnosis: Atrial flutter  Assessment and Plan of Treatment:

## 2025-01-10 NOTE — DISCHARGE NOTE PROVIDER - HOSPITAL COURSE
#Discharge: do not delete    79F admitted for possible seizure, aflutter.     T(C): 37.1 (01-10-25 @ 05:32), Max: 37.1 (01-10-25 @ 05:32)  HR: 52 (01-10-25 @ 05:32) (52 - 89)  BP: 137/84 (01-10-25 @ 05:32) (130/52 - 148/92)  RR: 19 (01-10-25 @ 05:32) (16 - 19)  SpO2: 99% (01-10-25 @ 05:32) (99% - 99%)    Hospital course (by problem):     #Seizure: last seizure 2022 as per daughter  - lactate mildly elevated now cleared, CK wnl, UA neg  - takes depakote at home which have been changed due to oversedation  - EEG: Routine EEG shows mild generalized slowing, no epileptiform activity.  - switch depakote to 500mg qhs  - neuro consult appreciated    #Atrial flutter: D/w daughter, she wants the patient to be more comfort focused, and would not like further evaluation of aflutter. Also would not like anticoagulation d/t risks of falls outweigh the benefit for stroke prophylaxis as the patient already has advanced dementia. Daughter would like to hold asa, DOAC, and no TTE. Confirmed w cardiology.    #Dementia: C/w her home meds. Soft diet, crush medicine and give with chocolate pudding.       Patient was discharged to: home w 24h HHA      Physical exam at the time of discharge:   General: NAD, sitting comfortably in bed   HEENT: PERRL/ EOMI, no scleral icterus, MMM  Respiratory: lungs CTA b/l, no wheezes/crackles, no accessory muscle use  Cardiovascular: Regular rhythm/rate; +S1 +S2  Gastrointestinal: Soft, NTND  Extremities: WWP, no cyanosis, no edema, pulses equal  Neurological: A&Ox1-2, no gross focal deficits, follows commands  Skin: Normal temperature, warm, dry #Discharge: do not delete    79F admitted for possible seizure, aflutter.     T(C): 37.1 (01-10-25 @ 05:32), Max: 37.1 (01-10-25 @ 05:32)  HR: 52 (01-10-25 @ 05:32) (52 - 89)  BP: 137/84 (01-10-25 @ 05:32) (130/52 - 148/92)  RR: 19 (01-10-25 @ 05:32) (16 - 19)  SpO2: 99% (01-10-25 @ 05:32) (99% - 99%)    Hospital course (by problem):     #Seizure: last seizure 2022 as per daughter  - lactate mildly elevated now cleared, CK wnl, UA neg  - takes depakote at home which have been changed due to oversedation  - EEG: Routine EEG shows mild generalized slowing, no epileptiform activity.  - switch depakote to 500mg qhs, no issues on this regimen   - neuro consult appreciated    #Atrial flutter: D/w daughter, she wants the patient to be more comfort focused, and would not like further evaluation of aflutter. Also would not like anticoagulation d/t risks of falls outweigh the benefit for stroke prophylaxis as the patient already has advanced dementia. Daughter would like to hold asa, DOAC, and no TTE. Confirmed w cardiology.    #Dementia: C/w her home meds. Soft diet, crush medicine and give with chocolate pudding.       Patient was discharged to: home w 24h HHA      Physical exam at the time of discharge:   General: NAD, sitting comfortably in bed   HEENT: PERRL/ EOMI, no scleral icterus, MMM  Respiratory: lungs CTA b/l, no wheezes/crackles, no accessory muscle use  Cardiovascular: Regular rhythm/rate; +S1 +S2  Gastrointestinal: Soft, NTND  Extremities: WWP, no cyanosis, no edema, pulses equal  Neurological: A&Ox1-2, no gross focal deficits, follows commands  Skin: Normal temperature, warm, dry

## 2025-01-10 NOTE — CONSULT NOTE ADULT - ASSESSMENT
78 yo Macanese speaking F with h/o seizures since 8/12/22 thought to be secondary to Olanzapine/Lybalvi, which was discontinued, and Depakote was increased at that time, advanced dementia, night-time incontinence was sent to  after having been found down on floor at home. Patient had been in her usual state of health, and was able to ambulate to the bathroom on her own.  Yesterday the aide heard a "thump", and found her "seizing on the floor". By the time daughter arrived home EMS was also there and patient had stopped seizing.  In ED, patient was noted to be in Aflutter.  I spoke to patient's daughter re seizure follow up and medication.  Daughter Renae states that since the seizure in 8/2022 patient has not had another seizure and has not seen a Neurologist.  Her depakote was reduced 2/2 SE of drowsiness by her psychiatrist Dr. Trujillo.  Aide at bedside describes this event as patient was shaking and drooling and rigid for 5 minutes and was unresponsive.  Daughter found diaper full of urine, no tongue bite, no LOC, no facial droop.        #breakthrough seizure    #advanced dementia    #new AFlutter-daughter deferring work up and AC      Recommendations  -Monitor on tele  -seizure precautions  -Give one dose of depakote DR 125mg now, then change Depakote dose to 500mg ER starting tonight  -F/U EEG  -Check for metabolic instability, infection  -Follow up with Neurology outpatient  -neurology to follow.  Please page or call with any questions    D/W Dr. Hoffmann, Dr. Keyes, patient's daugher on the phone, and  aide at bedside
Process of Care  --Reviewed dx/treatment problems and alignment with Goals of Care    Physical Aspects of Care  --Pain  c/w current managment    --Bowel Regimen  risk for constipation d/t immobility  daily dulcolax    --Dyspnea  comfortable and in NAD    --Nausea Vomiting  denies    --Weakness  PT as tolerated     Psychological and Psychiatric Aspects of Care:   --Greif/Bereavment: emotional support provided  --Hx of psychiatric dx: none  -Pastoral Care Available PRN     Social Aspects of Care  -SW involved     Cultural Aspects  -Primary Language: English    Goals of Care:     We discussed Palliative Care team being a supportive team when a patient has ongoing illnesses.  We also discussed that it is not an end of life care service, but can help navigate symptoms and emotional support througout their hospital stay here.    Hospice was explained as well  as an end of life care philosophy.  When a disease cannot be cured, or family/patient decide the treatment burdens out weigh the risk and one choses to change focus of treatment from cure to quality/comfort.       Prognosis: Death can occur at anytime, but if disease continues to progress naturally patient likely has days to weeks.    Ethical and Legal Aspects:   NA        Capacity: without capacity   Surrogate: no hcp on chart-  Renae Stephenson is pts daughter will follow  Code Status: dnr dni trial of niv  MOLST: dnr dni trial of niv limited- no feeding tubes +abx and fluid trial  Dispo Plan: pending further discusison    Discussed With: Case coordinated with attending and SW and RN     Time Spent: 90 minutes including the care, coordination and counseling of this patient, 50% of which was spent coordinating and counseling.

## 2025-01-10 NOTE — PROGRESS NOTE ADULT - SUBJECTIVE AND OBJECTIVE BOX
CHIEF COMPLAINT:    SUBJECTIVE/SIGNIFICANT INTERVAL EVENTS/OVERNIGHT EVENTS:    Review of Systems: 14 Point review of systems reviewed and reported as negative unless otherwise stated in HPI    FROM H&P:   79F with h/o seizures and dementia was sent to  after having been found down on floor at home. I spoke with Renae over the phone, As per daughter pt has 24 hr aides. Patient had been in her usual state of health, she is able to ambulate to the bathroom on her own. Today the aide heard a "thump", and found her "seizing on the floor". By the time daughter arrived home EMS was also there and patient had stopped seizing. As per daughter, at baseline patient is able to answer "yes or no" to questions. In ED VSS, CBC/CMP unremarkable. ECG initially noted for Aflutter, patient later converted to NSR. Flu/COVID negative. Patient admitted for possible seizure.     1/10: no acute events, pt cooperative but confused     PHYSICAL EXAM:    T(C): 37.1 (01-10-25 @ 05:32), Max: 37.1 (01-10-25 @ 05:32)  HR: 52 (01-10-25 @ 05:32) (52 - 89)  BP: 137/84 (01-10-25 @ 05:32) (130/52 - 148/92)  RR: 19 (01-10-25 @ 05:32) (16 - 19)  SpO2: 99% (01-10-25 @ 05:32) (99% - 99%)    General: AAOx1 NAD  Head: AT/NC  ENT: Moist Mucous Membranes; No Injury  Eyes: EOMI;   Neck: Non-tender; No JVD  CVS: RRR, S1&S2, No murmur, No edema  Respiratory: Lungs CTA B/L; Normal Respiratory Effort  Abdomen/GI: Soft, non-tender, non-distended, no guarding  Extremities: No cyanosis, No clubbing, No edema  Neuro: AAOx1 non-focal  Psych: Appropriate, Cooperative, No depression, No anxiety  Skin: Clean, Dry and Intact      LABS:                          11.7   5.45  )-----------( See note    ( 10 Julian 2025 07:43 )             37.0     01-10    138  |  110[H]  |  19  ----------------------------<  93  4.8   |  25  |  0.83    Ca    8.5      10 Julian 2025 07:43  Phos  3.1     01-10  Mg     2.3     01-10    TPro  7.3  /  Alb  3.2[L]  /  TBili  0.4  /  DBili  x   /  AST  22  /  ALT  11[L]  /  AlkPhos  93  01-10      CAPILLARY BLOOD GLUCOSE          Urinalysis with Rflx Culture (collected 10 Julian 2025 01:00)          RADIOLOGY:      EKG:      ECHO:      PROCEDURES:        I personally reviewed labs, imaging, ekg, orders and vitals.    Discussed case with:  []RN  []CM/SW  []Patient  []Family  []Specialist:        MEDICATIONS  (STANDING):  divalproex  milliGRAM(s) Oral at bedtime    MEDICATIONS  (PRN):  acetaminophen     Tablet .. 650 milliGRAM(s) Oral every 6 hours PRN Temp greater or equal to 38C (100.4F), Mild Pain (1 - 3)  aluminum hydroxide/magnesium hydroxide/simethicone Suspension 30 milliLiter(s) Oral every 4 hours PRN Dyspepsia  melatonin 3 milliGRAM(s) Oral at bedtime PRN Insomnia  ondansetron Injectable 4 milliGRAM(s) IV Push every 8 hours PRN Nausea and/or Vomiting

## 2025-01-10 NOTE — DISCHARGE NOTE NURSING/CASE MANAGEMENT/SOCIAL WORK - NSDCPEFALRISK_GEN_ALL_CORE
For information on Fall & Injury Prevention, visit: [email or web address] OR  [email or web address] OR  https://www.cdc.gov/steadi/patient.html

## 2025-01-10 NOTE — DISCHARGE NOTE NURSING/CASE MANAGEMENT/SOCIAL WORK - FINANCIAL ASSISTANCE
[facility] provides services at a reduced cost to those who are determined to be eligible through [facility]’s financial assistance program. Information regarding [facility]’s financial assistance program can be found by going to [email or web address] or by calling [phone].

## 2025-01-10 NOTE — DISCHARGE NOTE NURSING/CASE MANAGEMENT/SOCIAL WORK - PATIENT PORTAL LINK FT
You can access the FollowMyHealth Patient Portal offered by [facility] by registering at the following website: [email or web address]. By joining [facility]’s FollowMyHealth portal, you will also be able to view your health information using other applications (apps) compatible with our system.

## 2025-01-10 NOTE — CONSULT NOTE ADULT - PROBLEM SELECTOR RECOMMENDATION 9
presented after fall found to be in Aflutter (new) with variable block. rates controlled. presented after fall found to be in Aflutter (new) with variable block. rates controlled.  TSH normal   daughter declined anticoagulation for stroke prevention due to concern for falls/bleeding risk and declines invasive procedures.   she converted to sinus bradycardia- unable to tolerate beta blockers   plan for DC home with daughter     will sign off. please reconsult as needed.

## 2025-01-10 NOTE — PROGRESS NOTE ADULT - ASSESSMENT
79F admitted for possible seizure, aflutter.       #Seizure: last seizure 2022 as per daughter  - lactate mildly elevated now cleared, CK wnl, UA neg  - takes depakote at home which have been changed due to oversedation  - EEG: Routine EEG shows mild generalized slowing, no epileptiform activity.  - switch depakote to 500mg qhs  - neuro consult appreciated    #Atrial flutter: D/w daughter, she wants the patient to be more comfort focused, and would not like further evaluation of aflutter. Also would not like anticoagulation d/t risks of falls outweigh the benefit for stroke prophylaxis as the patient already has advanced dementia. Daughter would like to hold asa, DOAC, and no TTE. Confirmed w cardiology.    #Dementia: C/w her home meds. Soft diet, crush medicine and give with chocolate pudding.       #dvt ppx - SCDs     #dispo - DC in AM after monitoring med change , d/w daughter at bedside

## 2025-01-11 VITALS
TEMPERATURE: 98 F | HEART RATE: 61 BPM | SYSTOLIC BLOOD PRESSURE: 100 MMHG | RESPIRATION RATE: 18 BRPM | OXYGEN SATURATION: 99 % | DIASTOLIC BLOOD PRESSURE: 79 MMHG

## 2025-01-11 PROCEDURE — 99239 HOSP IP/OBS DSCHRG MGMT >30: CPT

## 2025-01-11 RX ORDER — DIVALPROEX SODIUM 500 MG/1
1 TABLET, DELAYED RELEASE ORAL
Qty: 30 | Refills: 2
Start: 2025-01-11 | End: 2025-04-10

## 2025-01-11 RX ORDER — DIVALPROEX SODIUM 500 MG/1
1 TABLET, DELAYED RELEASE ORAL
Refills: 0 | DISCHARGE

## 2025-01-17 DIAGNOSIS — I48.92 UNSPECIFIED ATRIAL FLUTTER: ICD-10-CM

## 2025-01-17 DIAGNOSIS — R56.9 UNSPECIFIED CONVULSIONS: ICD-10-CM

## 2025-01-17 DIAGNOSIS — F03.C11 UNSPECIFIED DEMENTIA, SEVERE, WITH AGITATION: ICD-10-CM

## 2025-04-17 ENCOUNTER — EMERGENCY (EMERGENCY)
Facility: HOSPITAL | Age: 80
LOS: 0 days | Discharge: HOME | End: 2025-04-17
Attending: STUDENT IN AN ORGANIZED HEALTH CARE EDUCATION/TRAINING PROGRAM
Payer: MEDICARE

## 2025-04-17 VITALS
OXYGEN SATURATION: 99 % | RESPIRATION RATE: 16 BRPM | DIASTOLIC BLOOD PRESSURE: 71 MMHG | SYSTOLIC BLOOD PRESSURE: 122 MMHG | HEART RATE: 67 BPM | TEMPERATURE: 98 F

## 2025-04-17 VITALS
DIASTOLIC BLOOD PRESSURE: 68 MMHG | HEART RATE: 65 BPM | SYSTOLIC BLOOD PRESSURE: 125 MMHG | WEIGHT: 149.91 LBS | HEIGHT: 65 IN | OXYGEN SATURATION: 91 % | RESPIRATION RATE: 20 BRPM

## 2025-04-17 DIAGNOSIS — R06.02 SHORTNESS OF BREATH: ICD-10-CM

## 2025-04-17 DIAGNOSIS — J18.9 PNEUMONIA, UNSPECIFIED ORGANISM: ICD-10-CM

## 2025-04-17 DIAGNOSIS — Z91.018 ALLERGY TO OTHER FOODS: ICD-10-CM

## 2025-04-17 DIAGNOSIS — Z86.69 PERSONAL HISTORY OF OTHER DISEASES OF THE NERVOUS SYSTEM AND SENSE ORGANS: ICD-10-CM

## 2025-04-17 LAB
ALBUMIN SERPL ELPH-MCNC: 2.8 G/DL — LOW (ref 3.3–5)
ALP SERPL-CCNC: 119 U/L — SIGNIFICANT CHANGE UP (ref 40–120)
ALT FLD-CCNC: 32 U/L — SIGNIFICANT CHANGE UP (ref 12–78)
ANION GAP SERPL CALC-SCNC: 8 MMOL/L — SIGNIFICANT CHANGE UP (ref 5–17)
AST SERPL-CCNC: 39 U/L — HIGH (ref 15–37)
BASOPHILS # BLD AUTO: 0.02 K/UL — SIGNIFICANT CHANGE UP (ref 0–0.2)
BASOPHILS NFR BLD AUTO: 0.3 % — SIGNIFICANT CHANGE UP (ref 0–2)
BILIRUB SERPL-MCNC: 0.4 MG/DL — SIGNIFICANT CHANGE UP (ref 0.2–1.2)
BUN SERPL-MCNC: 27 MG/DL — HIGH (ref 7–23)
CALCIUM SERPL-MCNC: 9.2 MG/DL — SIGNIFICANT CHANGE UP (ref 8.5–10.1)
CHLORIDE SERPL-SCNC: 105 MMOL/L — SIGNIFICANT CHANGE UP (ref 96–108)
CO2 SERPL-SCNC: 21 MMOL/L — LOW (ref 22–31)
CREAT SERPL-MCNC: 0.93 MG/DL — SIGNIFICANT CHANGE UP (ref 0.5–1.3)
EGFR: 63 ML/MIN/1.73M2 — SIGNIFICANT CHANGE UP
EGFR: 63 ML/MIN/1.73M2 — SIGNIFICANT CHANGE UP
EOSINOPHIL # BLD AUTO: 0.02 K/UL — SIGNIFICANT CHANGE UP (ref 0–0.5)
EOSINOPHIL NFR BLD AUTO: 0.3 % — SIGNIFICANT CHANGE UP (ref 0–6)
GLUCOSE SERPL-MCNC: 163 MG/DL — HIGH (ref 70–99)
HCT VFR BLD CALC: 38 % — SIGNIFICANT CHANGE UP (ref 34.5–45)
HGB BLD-MCNC: 12.2 G/DL — SIGNIFICANT CHANGE UP (ref 11.5–15.5)
IMM GRANULOCYTES # BLD AUTO: 0.06 K/UL — SIGNIFICANT CHANGE UP (ref 0–0.07)
IMM GRANULOCYTES NFR BLD AUTO: 0.9 % — SIGNIFICANT CHANGE UP (ref 0–0.9)
LYMPHOCYTES # BLD AUTO: 0.67 K/UL — LOW (ref 1–3.3)
LYMPHOCYTES NFR BLD AUTO: 10.5 % — LOW (ref 13–44)
MANUAL SMEAR VERIFICATION: SIGNIFICANT CHANGE UP
MCHC RBC-ENTMCNC: 30 PG — SIGNIFICANT CHANGE UP (ref 27–34)
MCHC RBC-ENTMCNC: 32.1 G/DL — SIGNIFICANT CHANGE UP (ref 32–36)
MCV RBC AUTO: 93.4 FL — SIGNIFICANT CHANGE UP (ref 80–100)
MONOCYTES # BLD AUTO: 0.56 K/UL — SIGNIFICANT CHANGE UP (ref 0–0.9)
MONOCYTES NFR BLD AUTO: 8.8 % — SIGNIFICANT CHANGE UP (ref 2–14)
NEUTROPHILS # BLD AUTO: 5.04 K/UL — SIGNIFICANT CHANGE UP (ref 1.8–7.4)
NEUTROPHILS NFR BLD AUTO: 79.2 % — HIGH (ref 43–77)
NRBC # BLD AUTO: 0 K/UL — SIGNIFICANT CHANGE UP (ref 0–0)
NRBC # FLD: 0 K/UL — SIGNIFICANT CHANGE UP (ref 0–0)
NRBC BLD AUTO-RTO: 0 /100 WBCS — SIGNIFICANT CHANGE UP (ref 0–0)
PLAT MORPH BLD: NORMAL — SIGNIFICANT CHANGE UP
PLATELET # BLD AUTO: 243 K/UL — SIGNIFICANT CHANGE UP (ref 150–400)
PMV BLD: 13.3 FL — HIGH (ref 7–13)
POTASSIUM SERPL-MCNC: 4.9 MMOL/L — SIGNIFICANT CHANGE UP (ref 3.5–5.3)
POTASSIUM SERPL-SCNC: 4.9 MMOL/L — SIGNIFICANT CHANGE UP (ref 3.5–5.3)
PROT SERPL-MCNC: 7.8 GM/DL — SIGNIFICANT CHANGE UP (ref 6–8.3)
RBC # BLD: 4.07 M/UL — SIGNIFICANT CHANGE UP (ref 3.8–5.2)
RBC # FLD: 14.6 % — HIGH (ref 10.3–14.5)
RBC BLD AUTO: NORMAL — SIGNIFICANT CHANGE UP
SODIUM SERPL-SCNC: 134 MMOL/L — LOW (ref 135–145)
WBC # BLD: 6.37 K/UL — SIGNIFICANT CHANGE UP (ref 3.8–10.5)
WBC # FLD AUTO: 6.37 K/UL — SIGNIFICANT CHANGE UP (ref 3.8–10.5)
WBC MORPHOLOGY: NORMAL — SIGNIFICANT CHANGE UP

## 2025-04-17 PROCEDURE — 99285 EMERGENCY DEPT VISIT HI MDM: CPT

## 2025-04-17 PROCEDURE — 85025 COMPLETE CBC W/AUTO DIFF WBC: CPT

## 2025-04-17 PROCEDURE — 80053 COMPREHEN METABOLIC PANEL: CPT

## 2025-04-17 PROCEDURE — 99284 EMERGENCY DEPT VISIT MOD MDM: CPT | Mod: 25

## 2025-04-17 PROCEDURE — 36415 COLL VENOUS BLD VENIPUNCTURE: CPT

## 2025-04-17 PROCEDURE — 36000 PLACE NEEDLE IN VEIN: CPT

## 2025-04-17 NOTE — ED PROVIDER NOTE - PHYSICAL EXAMINATION
GENERAL: awake, non-toxic appearing, no acute distress  HEENT: NCAT, EOMI, oral mucosa moist, normal conjunctiva  RESP: no respiratory distress, CTAB, no wheezes/rhonchi/rales  CV: RRR, no murmurs/rubs/gallops  ABDOMEN: soft, non-tender, non-distended, no guarding, no rebound tenderness  MSK: no visible deformities  NEURO: no focal sensory or motor deficits  SKIN: tactile fever, normal color, well perfused, no rash

## 2025-04-17 NOTE — ED PROVIDER NOTE - PATIENT PORTAL LINK FT
You can access the FollowMyHealth Patient Portal offered by [facility] by registering at the following website: [email or web address]. By joining [facility]’s FollowMyHealth portal, you will also be able to view your health information using other applications (apps) compatible with our system.

## 2025-04-17 NOTE — ED ADULT TRIAGE NOTE - CHIEF COMPLAINT QUOTE
Pt bibems from home who lives with daughter. As per ems " pt found to be 91%RA, not on o2 at home. Placed on 5L NC came up to 95%. Got chest xray on Tuesday, and daughter wants to r/o pneumonia and UTI" Pt nonverbal at baseline, HX of dementia. + allergies. Warm to touch. Stat ekg, monitor. Unable to obtain oral temp. Pt bibems from home who lives with daughter. As per ems " pt found to be 91%RA, not on o2 at home. Placed on 5L NC came up to 95%. Got chest xray on Tuesday, and daughter wants to r/o pneumonia and UTI" Pt nonverbal at baseline, HX of dementia. + allergies. Warm to touch. Stat ekg, monitor. Unable to obtain oral temp. EMS

## 2025-04-17 NOTE — ED ADULT NURSE NOTE - OBJECTIVE STATEMENT
pt presents to ER for MD evaluation. daughter at bedside states "I only brought her in to the hospital because I want to start the process on home hospice. I heard it is easier to start from the hospital." pt nonverbal at baseline. hx seizure,

## 2025-04-17 NOTE — CHART NOTE - NSCHARTNOTEFT_GEN_A_CORE
Pt is a 79 yr old female with PMH of seizures, and dementia. Pt presented to  ED via ambulance from home. Pt's dtr, Renae Stephenson [phone], requesting pt's care be made comfort measures only, with a referral to home hospice. SW met with pt to discuss options and offer emotional support. Renae stated she was told she needed a doctor's documentation stating pt was hospice appropriate. Pt was seen by Palliative team during January 2025 hospital encounter, and per 1/10/25 note, "patient is Hospice appropriate if that is the route family is ready for and a more comfort focus." Renae was not prepared to commit to hospice in January, but has seen a steady decline in pt's health since then.   [facility] sent. Renae comfortable with taking pt home as  pt has 24/7 aides through [employer] program. BLS transport arranged by ED unit clerk. SW contact information and emotional support provided. SW will continue to follow.

## 2025-04-17 NOTE — ED ADULT NURSE NOTE - NSFALLRISKINTERV_ED_ALL_ED

## 2025-04-17 NOTE — ED PROVIDER NOTE - PROGRESS NOTE DETAILS
Patient seen by Mariama from social work, arranging home hospice.  Patient had screening labs performed at the request of hospice (they can follow-up results), ambulance transport arranged to bring patient home.  Stable for discharge.

## 2025-04-17 NOTE — ED PROVIDER NOTE - OBJECTIVE STATEMENT
79-year-old female PMH seizures, dementia, presents emergency department for pneumonia.  Per daughter at bedside, patient is currently receiving antibiotics for known diagnosis of pneumonia.  She request the patient be made comfort measures only and that she would not desire this treatment.  Patient is nonverbal.  Previous MOLST demonstrated trial of IV fluids and antibiotics, new MOLST completed by RNs.

## 2025-04-17 NOTE — ED PROVIDER NOTE - NSFOLLOWUPINSTRUCTIONS_ED_ALL_ED_FT
You were seen in the emergency department for pneumonia.  Our  is arranging for home hospice.  Please follow-up as directed.    Rest, drink plenty of fluids.  Advance activity as tolerated.  Continue all previously prescribed medications as directed.  Follow up with your primary care physician in 48-72 hours- bring copies of your results.  Return to the ER for worsening or persistent symptoms, and/or ANY NEW OR CONCERNING SYMPTOMS. If you have issues obtaining follow up, please call: [phone] to obtain a doctor or specialist who takes your insurance in your area.

## 2025-04-17 NOTE — ED PROVIDER NOTE - CLINICAL SUMMARY MEDICAL DECISION MAKING FREE TEXT BOX
79-year-old female presenting with no pneumonia, daughter at bedside requesting patient be made comfort measures only.  States the process will take too long outpatient.  New MOLST completed, daughter requests no labs, no imaging, no antibiotics, no fluids.

## 2025-04-17 NOTE — ED ADULT NURSE NOTE - CHIEF COMPLAINT QUOTE
Pt bibems from home who lives with daughter. As per ems " pt found to be 91%RA, not on o2 at home. Placed on 5L NC came up to 95%. Got chest xray on Tuesday, and daughter wants to r/o pneumonia and UTI" Pt nonverbal at baseline, HX of dementia. + allergies. Warm to touch. Stat ekg, monitor. Unable to obtain oral temp. EMS